# Patient Record
Sex: MALE | Race: WHITE | HISPANIC OR LATINO | Employment: STUDENT | URBAN - METROPOLITAN AREA
[De-identification: names, ages, dates, MRNs, and addresses within clinical notes are randomized per-mention and may not be internally consistent; named-entity substitution may affect disease eponyms.]

---
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
## 2021-03-26 ENCOUNTER — IMMUNIZATION (OUTPATIENT)
Dept: INTERNAL MEDICINE | Facility: CLINIC | Age: 21
End: 2021-03-26
Payer: COMMERCIAL

## 2021-03-26 DIAGNOSIS — Z23 NEED FOR VACCINATION: Primary | ICD-10-CM

## 2021-03-26 PROCEDURE — 91300 COVID-19, MRNA, LNP-S, PF, 30 MCG/0.3 ML DOSE VACCINE: CPT | Mod: PBBFAC | Performed by: FAMILY MEDICINE

## 2021-04-15 ENCOUNTER — OFFICE VISIT (OUTPATIENT)
Dept: INTERNAL MEDICINE | Facility: CLINIC | Age: 21
End: 2021-04-15
Payer: COMMERCIAL

## 2021-04-15 ENCOUNTER — LAB VISIT (OUTPATIENT)
Dept: LAB | Facility: HOSPITAL | Age: 21
End: 2021-04-15
Attending: FAMILY MEDICINE
Payer: COMMERCIAL

## 2021-04-15 VITALS
BODY MASS INDEX: 25.68 KG/M2 | SYSTOLIC BLOOD PRESSURE: 122 MMHG | TEMPERATURE: 100 F | DIASTOLIC BLOOD PRESSURE: 84 MMHG | OXYGEN SATURATION: 98 % | HEIGHT: 65 IN | WEIGHT: 154.13 LBS | HEART RATE: 100 BPM

## 2021-04-15 DIAGNOSIS — Z00.00 PREVENTATIVE HEALTH CARE: Primary | ICD-10-CM

## 2021-04-15 DIAGNOSIS — Z11.59 ENCOUNTER FOR HEPATITIS C SCREENING TEST FOR LOW RISK PATIENT: ICD-10-CM

## 2021-04-15 DIAGNOSIS — Z13.1 SCREENING FOR DIABETES MELLITUS: ICD-10-CM

## 2021-04-15 DIAGNOSIS — Z00.00 PREVENTATIVE HEALTH CARE: ICD-10-CM

## 2021-04-15 DIAGNOSIS — E55.9 VITAMIN D DEFICIENCY: ICD-10-CM

## 2021-04-15 DIAGNOSIS — Z13.220 SCREENING FOR LIPID DISORDERS: ICD-10-CM

## 2021-04-15 PROCEDURE — 3008F PR BODY MASS INDEX (BMI) DOCUMENTED: ICD-10-PCS | Mod: CPTII,S$GLB,, | Performed by: FAMILY MEDICINE

## 2021-04-15 PROCEDURE — 99395 PR PREVENTIVE VISIT,EST,18-39: ICD-10-PCS | Mod: S$GLB,,, | Performed by: FAMILY MEDICINE

## 2021-04-15 PROCEDURE — 1126F AMNT PAIN NOTED NONE PRSNT: CPT | Mod: S$GLB,,, | Performed by: FAMILY MEDICINE

## 2021-04-15 PROCEDURE — 99999 PR PBB SHADOW E&M-EST. PATIENT-LVL IV: ICD-10-PCS | Mod: PBBFAC,,, | Performed by: FAMILY MEDICINE

## 2021-04-15 PROCEDURE — 3008F BODY MASS INDEX DOCD: CPT | Mod: CPTII,S$GLB,, | Performed by: FAMILY MEDICINE

## 2021-04-15 PROCEDURE — 99999 PR PBB SHADOW E&M-EST. PATIENT-LVL IV: CPT | Mod: PBBFAC,,, | Performed by: FAMILY MEDICINE

## 2021-04-15 PROCEDURE — 86803 HEPATITIS C AB TEST: CPT | Performed by: FAMILY MEDICINE

## 2021-04-15 PROCEDURE — 82306 VITAMIN D 25 HYDROXY: CPT | Performed by: FAMILY MEDICINE

## 2021-04-15 PROCEDURE — 99395 PREV VISIT EST AGE 18-39: CPT | Mod: S$GLB,,, | Performed by: FAMILY MEDICINE

## 2021-04-15 PROCEDURE — 36415 COLL VENOUS BLD VENIPUNCTURE: CPT | Performed by: FAMILY MEDICINE

## 2021-04-15 PROCEDURE — 80061 LIPID PANEL: CPT | Performed by: FAMILY MEDICINE

## 2021-04-15 PROCEDURE — 1126F PR PAIN SEVERITY QUANTIFIED, NO PAIN PRESENT: ICD-10-PCS | Mod: S$GLB,,, | Performed by: FAMILY MEDICINE

## 2021-04-15 PROCEDURE — 82947 ASSAY GLUCOSE BLOOD QUANT: CPT | Performed by: FAMILY MEDICINE

## 2021-04-15 RX ORDER — VIT C/E/ZN/COPPR/LUTEIN/ZEAXAN 250MG-90MG
1000 CAPSULE ORAL DAILY
Qty: 90 CAPSULE | Refills: 4 | Status: SHIPPED | OUTPATIENT
Start: 2021-04-15 | End: 2021-12-27 | Stop reason: DRUGHIGH

## 2021-04-16 ENCOUNTER — IMMUNIZATION (OUTPATIENT)
Dept: INTERNAL MEDICINE | Facility: CLINIC | Age: 21
End: 2021-04-16
Payer: COMMERCIAL

## 2021-04-16 DIAGNOSIS — Z23 NEED FOR VACCINATION: Primary | ICD-10-CM

## 2021-04-16 LAB
25(OH)D3+25(OH)D2 SERPL-MCNC: 19 NG/ML (ref 30–96)
CHOLEST SERPL-MCNC: 193 MG/DL (ref 120–199)
CHOLEST/HDLC SERPL: 3.7 {RATIO} (ref 2–5)
GLUCOSE SERPL-MCNC: 78 MG/DL (ref 70–110)
HDLC SERPL-MCNC: 52 MG/DL (ref 40–75)
HDLC SERPL: 26.9 % (ref 20–50)
LDLC SERPL CALC-MCNC: 93.4 MG/DL (ref 63–159)
NONHDLC SERPL-MCNC: 141 MG/DL
TRIGL SERPL-MCNC: 238 MG/DL (ref 30–150)

## 2021-04-16 PROCEDURE — 0002A COVID-19, MRNA, LNP-S, PF, 30 MCG/0.3 ML DOSE VACCINE: CPT | Mod: PBBFAC | Performed by: FAMILY MEDICINE

## 2021-04-16 PROCEDURE — 91300 COVID-19, MRNA, LNP-S, PF, 30 MCG/0.3 ML DOSE VACCINE: CPT | Mod: PBBFAC | Performed by: FAMILY MEDICINE

## 2021-04-19 LAB — HCV AB SERPL QL IA: NEGATIVE

## 2021-12-22 ENCOUNTER — PATIENT OUTREACH (OUTPATIENT)
Dept: ADMINISTRATIVE | Facility: OTHER | Age: 21
End: 2021-12-22
Payer: COMMERCIAL

## 2021-12-27 ENCOUNTER — PATIENT MESSAGE (OUTPATIENT)
Dept: INTERNAL MEDICINE | Facility: CLINIC | Age: 21
End: 2021-12-27

## 2021-12-27 ENCOUNTER — OFFICE VISIT (OUTPATIENT)
Dept: INTERNAL MEDICINE | Facility: CLINIC | Age: 21
End: 2021-12-27
Payer: COMMERCIAL

## 2021-12-27 VITALS
OXYGEN SATURATION: 98 % | HEART RATE: 97 BPM | SYSTOLIC BLOOD PRESSURE: 112 MMHG | BODY MASS INDEX: 25.74 KG/M2 | TEMPERATURE: 99 F | WEIGHT: 152.31 LBS | DIASTOLIC BLOOD PRESSURE: 68 MMHG

## 2021-12-27 DIAGNOSIS — E55.9 VITAMIN D DEFICIENCY: ICD-10-CM

## 2021-12-27 DIAGNOSIS — F33.0 MAJOR DEPRESSIVE DISORDER, RECURRENT, MILD: ICD-10-CM

## 2021-12-27 DIAGNOSIS — F41.1 GENERALIZED ANXIETY DISORDER: Primary | ICD-10-CM

## 2021-12-27 PROCEDURE — 3008F PR BODY MASS INDEX (BMI) DOCUMENTED: ICD-10-PCS | Mod: CPTII,S$GLB,, | Performed by: FAMILY MEDICINE

## 2021-12-27 PROCEDURE — 99213 OFFICE O/P EST LOW 20 MIN: CPT | Mod: S$GLB,,, | Performed by: FAMILY MEDICINE

## 2021-12-27 PROCEDURE — 99999 PR PBB SHADOW E&M-EST. PATIENT-LVL IV: CPT | Mod: PBBFAC,,, | Performed by: FAMILY MEDICINE

## 2021-12-27 PROCEDURE — 3078F PR MOST RECENT DIASTOLIC BLOOD PRESSURE < 80 MM HG: ICD-10-PCS | Mod: CPTII,S$GLB,, | Performed by: FAMILY MEDICINE

## 2021-12-27 PROCEDURE — 99999 PR PBB SHADOW E&M-EST. PATIENT-LVL IV: ICD-10-PCS | Mod: PBBFAC,,, | Performed by: FAMILY MEDICINE

## 2021-12-27 PROCEDURE — 3078F DIAST BP <80 MM HG: CPT | Mod: CPTII,S$GLB,, | Performed by: FAMILY MEDICINE

## 2021-12-27 PROCEDURE — 3074F PR MOST RECENT SYSTOLIC BLOOD PRESSURE < 130 MM HG: ICD-10-PCS | Mod: CPTII,S$GLB,, | Performed by: FAMILY MEDICINE

## 2021-12-27 PROCEDURE — 99213 PR OFFICE/OUTPT VISIT, EST, LEVL III, 20-29 MIN: ICD-10-PCS | Mod: S$GLB,,, | Performed by: FAMILY MEDICINE

## 2021-12-27 PROCEDURE — 3074F SYST BP LT 130 MM HG: CPT | Mod: CPTII,S$GLB,, | Performed by: FAMILY MEDICINE

## 2021-12-27 PROCEDURE — 3008F BODY MASS INDEX DOCD: CPT | Mod: CPTII,S$GLB,, | Performed by: FAMILY MEDICINE

## 2021-12-27 RX ORDER — CHOLECALCIFEROL (VITAMIN D3) 50 MCG
1 TABLET ORAL DAILY
Qty: 90 TABLET | Refills: 4 | Status: SHIPPED | OUTPATIENT
Start: 2021-12-27 | End: 2024-09-21

## 2021-12-27 NOTE — ASSESSMENT & PLAN NOTE
Lab Results   Component Value Date    [id number] 19 (L) 04/15/2021    [id number] 18 (L) 06/08/2019   He hasn't been taking his vitamin D regularly. We discussed strategies to help him overcome any barriers to adherence to medication regimen.

## 2021-12-27 NOTE — PATIENT INSTRUCTIONS
I have referred you to see one of our excellent mental health specialists for counseling services.    You must act SOON and call [phone] to schedule your appointment. I encourage you to ask to be placed on the waiting list for canceled appointments because new appointments often open up a day or two in advance when someone else cancels their appointment.    IMPORTANT: If you feel that you are experiencing an emotional crisis, go to the nearest emergency room or call the crisis intervention center at [phone].       I'm giving you information about additional mental health counseling options available in the community if those are more convenient for you.         Puja Zhou, [facility]       [address], [address] [place] [address]       Phone: [phone]         Mehdi Altman, [facility]       [address] [place] [address]       Phone: [phone]         [facility]       [address] [place] [address]       Phone: [phone]       [email or web address]         [facility]       [address] [place] [address]       Phone: [phone]       [email or web address]

## 2021-12-27 NOTE — PROGRESS NOTES
CHIEF COMPLAINT: Anxiety    He reports chronic problems of depression and anxiety, previously treated by mental health counselor, but he lost that relationship at the start of the COVID-19 pandemic. He reports increased depression and anxiety symptoms over the last several months. He identifies a number of life circumstances as exacerbating factors. He reports no history of bipolar disorder or hypomania symptoms. He accepted referral for mental health counseling. He feels that the severity of symptoms do not warrant pharmacotherapy at this point. I instructed him to let me know if he changes his mind.      PHQ-15, a somatic symptom scale consisting of fifteen items, each of which is scored 0 to 2, providing a 0 to 30 severity score.  PHQ-15 Score = 1 (NORMAL = MILD somatic symptoms)    LAYNE-7, an anxiety screening scale consisting of seven items, each of which is scored 0 to 3, providing a 0 to 21 severity score.  LAYNE-7 score = 11 (MEDIUM anxiety)    Panic Disorder Screen: Negative    PHQ-9, a depression screening scale consisting of nine items, each of which is scored 0 to 3, providing a 0 to 27 severity score.  PHQ-9 SCORE = 9 (MILD depression)  PSYCHOTIC FEATURES (hallucinations, delusions, paranoia, disorganized speech, etc.) are ABSENT  SUICIDALITY ASSESSMENT: He has no suicidal thoughts or thoughts he would be better off dead.     No complaints or concerns reported except as noted herein.    DIAGNOSES SPECIFICALLY EVALUATED AND TREATED THIS ENCOUNTER  1. Generalized anxiety disorder  This is a chronic problem, unstable/uncontrolled or with exacerbation or progression.   - Ambulatory referral/consult to Psychology; Future    2. Major depressive disorder, recurrent, mild  This is a chronic problem, unstable/uncontrolled or with exacerbation or progression.  - Ambulatory referral/consult to Psychology; Future    3. Vitamin D deficiency  This is a chronic problem, stable.   - cholecalciferol, vitamin D3, (VITAMIN  D3) 50 mcg (2,000 unit) Tab; Take 1 tablet (2,000 Units total) by mouth once daily.  Dispense: 90 tablet; Refill: 4      Follow up in about 4 months (around 4/20/2022), or if symptoms worsen or fail to improve, for wellness and preventive services.    PRESCRIPTION DRUG MANAGEMENT  Outpatient Medications Prior to Visit   Medication Sig Dispense Refill    cholecalciferol, vitamin D3, (VITAMIN D3) 25 mcg (1,000 unit) capsule Take 1 capsule (1,000 Units total) by mouth once daily. 90 capsule 4     No facility-administered medications prior to visit.      Medications Discontinued During This Encounter   Medication Reason    cholecalciferol, vitamin D3, (VITAMIN D3) 25 mcg (1,000 unit) capsule Dose adjustment      Medications Ordered This Encounter   Medications    cholecalciferol, vitamin D3, (VITAMIN D3) 50 mcg (2,000 unit) Tab     Sig: Take 1 tablet (2,000 Units total) by mouth once daily.     Dispense:  90 tablet     Refill:  4     NOTE DOSE CHANGE. If insurance does not cover, please offer OTC equivalent. OK to vary dispense quantity to correspond with in-stock OTC unit quantities.       Problem List Items Addressed This Visit     Vitamin D deficiency    Current Assessment & Plan     Lab Results   Component Value Date    [id number] 19 (L) 04/15/2021    [id number] 18 (L) 06/08/2019   He hasn't been taking his vitamin D regularly. We discussed strategies to help him overcome any barriers to adherence to medication regimen.         Relevant Medications    cholecalciferol, vitamin D3, (VITAMIN D3) 50 mcg (2,000 unit) Tab      Other Visit Diagnoses     Generalized anxiety disorder    -  Primary    Relevant Orders    Ambulatory referral/consult to Psychology    Major depressive disorder, recurrent, mild        Relevant Orders    Ambulatory referral/consult to Psychology        DATA REVIEWED:     -lab results    Subjective   Review of Systems   Respiratory: Negative for chest tightness and shortness of breath.     Cardiovascular: Negative for chest pain.   Neurological: Negative for seizures and memory loss.   Psychiatric/Behavioral: Positive for dysphoric mood. Negative for agitation, confusion, hallucinations, self-injury and suicidal ideas. The patient is nervous/anxious.         PHYSICAL EXAMObjective   Vitals:    12/27/21 1324   BP: 112/68   BP Location: Left arm   Patient Position: Sitting   BP Method: Medium (Manual)   Pulse: 97   Temp: 99.2 °F (37.3 °C)   TempSrc: Tympanic   SpO2: 98%   Weight: 69.1 kg (152 lb 5.4 oz)     Physical Exam  Vitals reviewed.   Constitutional:       General: He is not in acute distress.     Appearance: Normal appearance. He is not ill-appearing, toxic-appearing or diaphoretic.   Cardiovascular:      Rate and Rhythm: Normal rate and regular rhythm.   Pulmonary:      Effort: Pulmonary effort is normal.   Neurological:      General: No focal deficit present.      Mental Status: He is alert and oriented to person, place, and time. Mental status is at baseline.   Psychiatric:         Attention and Perception: Attention and perception normal.         Mood and Affect: Affect normal. Mood is depressed (mild).         Speech: Speech normal.         Behavior: Behavior normal. Behavior is cooperative.         Thought Content: Thought content normal. Thought content is not paranoid. Thought content does not include homicidal or suicidal ideation.         Cognition and Memory: Cognition and memory normal.         Judgment: Judgment normal.        TOTAL TIME evaluating and managing this patient for this encounter was greater than or equal to 22 minutes. This time was spent personally by me on the following activities: review of nurse's notes, pre-charting, review of patient's past medical history, assessing age-appropriate health maintenance needs, review of any interval history, obtaining history from the patient, examination of the patient, ordering referral to subspecialty provider(s),  "patient-education regarding diagnosis, treatment plan, and goals of treatment, discussing planned follow-up and final documentation of the visit. This time was exclusive of any separately billable procedures for this patient and exclusive of time spent treating any other patients.    Documentation entered by me for this encounter may have been done in part using speech-recognition technology. Although I have made an effort to ensure accuracy, "sound like" errors may exist and should be interpreted in context. -RUDY Oquendo MD.    "

## 2021-12-28 ENCOUNTER — IMMUNIZATION (OUTPATIENT)
Dept: PRIMARY CARE CLINIC | Facility: CLINIC | Age: 21
End: 2021-12-28
Payer: COMMERCIAL

## 2021-12-28 DIAGNOSIS — Z23 NEED FOR VACCINATION: Primary | ICD-10-CM

## 2021-12-28 PROCEDURE — 0004A COVID-19, MRNA, LNP-S, PF, 30 MCG/0.3 ML DOSE VACCINE: CPT | Mod: CV19,PBBFAC | Performed by: FAMILY MEDICINE

## 2021-12-29 ENCOUNTER — OFFICE VISIT (OUTPATIENT)
Dept: OPHTHALMOLOGY | Facility: CLINIC | Age: 21
End: 2021-12-29
Payer: COMMERCIAL

## 2021-12-29 DIAGNOSIS — H53.8 BLURRED VISION, BILATERAL: Primary | ICD-10-CM

## 2021-12-29 DIAGNOSIS — H52.13 MYOPIA, BILATERAL: ICD-10-CM

## 2021-12-29 PROCEDURE — 92014 PR EYE EXAM, EST PATIENT,COMPREHESV: ICD-10-PCS | Mod: S$GLB,,, | Performed by: OPTOMETRIST

## 2021-12-29 PROCEDURE — 92015 PR REFRACTION: ICD-10-PCS | Mod: S$GLB,,, | Performed by: OPTOMETRIST

## 2021-12-29 PROCEDURE — 92014 COMPRE OPH EXAM EST PT 1/>: CPT | Mod: S$GLB,,, | Performed by: OPTOMETRIST

## 2021-12-29 PROCEDURE — 92015 DETERMINE REFRACTIVE STATE: CPT | Mod: S$GLB,,, | Performed by: OPTOMETRIST

## 2021-12-29 PROCEDURE — 99999 PR PBB SHADOW E&M-EST. PATIENT-LVL II: ICD-10-PCS | Mod: PBBFAC,,, | Performed by: OPTOMETRIST

## 2021-12-29 PROCEDURE — 99999 PR PBB SHADOW E&M-EST. PATIENT-LVL II: CPT | Mod: PBBFAC,,, | Performed by: OPTOMETRIST

## 2022-01-03 ENCOUNTER — IMMUNIZATION (OUTPATIENT)
Dept: PHARMACY | Facility: CLINIC | Age: 22
End: 2022-01-03
Payer: COMMERCIAL
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
## 2022-02-01 ENCOUNTER — TELEPHONE (OUTPATIENT)
Dept: PSYCHIATRY | Facility: CLINIC | Age: 22
End: 2022-02-01
Payer: COMMERCIAL

## 2022-02-01 ENCOUNTER — OFFICE VISIT (OUTPATIENT)
Dept: PSYCHIATRY | Facility: CLINIC | Age: 22
End: 2022-02-01
Payer: COMMERCIAL

## 2022-02-01 ENCOUNTER — LAB VISIT (OUTPATIENT)
Dept: LAB | Facility: HOSPITAL | Age: 22
End: 2022-02-01
Payer: COMMERCIAL

## 2022-02-01 VITALS
DIASTOLIC BLOOD PRESSURE: 78 MMHG | SYSTOLIC BLOOD PRESSURE: 123 MMHG | WEIGHT: 149.06 LBS | HEART RATE: 81 BPM | BODY MASS INDEX: 25.19 KG/M2

## 2022-02-01 DIAGNOSIS — F32.A DEPRESSION, UNSPECIFIED DEPRESSION TYPE: ICD-10-CM

## 2022-02-01 DIAGNOSIS — F41.1 GENERALIZED ANXIETY DISORDER: ICD-10-CM

## 2022-02-01 DIAGNOSIS — G47.00 INSOMNIA, UNSPECIFIED TYPE: ICD-10-CM

## 2022-02-01 DIAGNOSIS — F41.1 GENERALIZED ANXIETY DISORDER: Primary | ICD-10-CM

## 2022-02-01 LAB
ALBUMIN SERPL BCP-MCNC: 4.2 G/DL (ref 3.5–5.2)
ALP SERPL-CCNC: 87 U/L (ref 55–135)
ALT SERPL W/O P-5'-P-CCNC: 15 U/L (ref 10–44)
ANION GAP SERPL CALC-SCNC: 9 MMOL/L (ref 8–16)
AST SERPL-CCNC: 17 U/L (ref 10–40)
BASOPHILS # BLD AUTO: 0.06 K/UL (ref 0–0.2)
BASOPHILS NFR BLD: 0.8 % (ref 0–1.9)
BILIRUB SERPL-MCNC: 0.5 MG/DL (ref 0.1–1)
BUN SERPL-MCNC: 11 MG/DL (ref 6–20)
CALCIUM SERPL-MCNC: 9.7 MG/DL (ref 8.7–10.5)
CHLORIDE SERPL-SCNC: 103 MMOL/L (ref 95–110)
CO2 SERPL-SCNC: 28 MMOL/L (ref 23–29)
CREAT SERPL-MCNC: 0.9 MG/DL (ref 0.5–1.4)
DIFFERENTIAL METHOD: NORMAL
EOSINOPHIL # BLD AUTO: 0.1 K/UL (ref 0–0.5)
EOSINOPHIL NFR BLD: 1.1 % (ref 0–8)
ERYTHROCYTE [DISTWIDTH] IN BLOOD BY AUTOMATED COUNT: 12.3 % (ref 11.5–14.5)
EST. GFR  (AFRICAN AMERICAN): >60 ML/MIN/1.73 M^2
EST. GFR  (NON AFRICAN AMERICAN): >60 ML/MIN/1.73 M^2
GLUCOSE SERPL-MCNC: 89 MG/DL (ref 70–110)
HCT VFR BLD AUTO: 47.3 % (ref 40–54)
HGB BLD-MCNC: 15.5 G/DL (ref 14–18)
IMM GRANULOCYTES # BLD AUTO: 0.02 K/UL (ref 0–0.04)
IMM GRANULOCYTES NFR BLD AUTO: 0.3 % (ref 0–0.5)
LYMPHOCYTES # BLD AUTO: 2 K/UL (ref 1–4.8)
LYMPHOCYTES NFR BLD: 28 % (ref 18–48)
MCH RBC QN AUTO: 30.5 PG (ref 27–31)
MCHC RBC AUTO-ENTMCNC: 32.8 G/DL (ref 32–36)
MCV RBC AUTO: 93 FL (ref 82–98)
MONOCYTES # BLD AUTO: 0.7 K/UL (ref 0.3–1)
MONOCYTES NFR BLD: 9.9 % (ref 4–15)
NEUTROPHILS # BLD AUTO: 4.3 K/UL (ref 1.8–7.7)
NEUTROPHILS NFR BLD: 59.9 % (ref 38–73)
NRBC BLD-RTO: 0 /100 WBC
PLATELET # BLD AUTO: 259 K/UL (ref 150–450)
PMV BLD AUTO: 10.3 FL (ref 9.2–12.9)
POTASSIUM SERPL-SCNC: 4 MMOL/L (ref 3.5–5.1)
PROT SERPL-MCNC: 7.9 G/DL (ref 6–8.4)
RBC # BLD AUTO: 5.09 M/UL (ref 4.6–6.2)
SODIUM SERPL-SCNC: 140 MMOL/L (ref 136–145)
T4 FREE SERPL-MCNC: 1.01 NG/DL (ref 0.71–1.51)
TSH SERPL DL<=0.005 MIU/L-ACNC: 1.73 UIU/ML (ref 0.4–4)
WBC # BLD AUTO: 7.25 K/UL (ref 3.9–12.7)

## 2022-02-01 PROCEDURE — 3078F PR MOST RECENT DIASTOLIC BLOOD PRESSURE < 80 MM HG: ICD-10-PCS | Mod: CPTII,S$GLB,, | Performed by: PSYCHIATRY & NEUROLOGY

## 2022-02-01 PROCEDURE — 3008F BODY MASS INDEX DOCD: CPT | Mod: CPTII,S$GLB,, | Performed by: PSYCHIATRY & NEUROLOGY

## 2022-02-01 PROCEDURE — 3008F PR BODY MASS INDEX (BMI) DOCUMENTED: ICD-10-PCS | Mod: CPTII,S$GLB,, | Performed by: PSYCHIATRY & NEUROLOGY

## 2022-02-01 PROCEDURE — 84439 ASSAY OF FREE THYROXINE: CPT | Performed by: PSYCHIATRY & NEUROLOGY

## 2022-02-01 PROCEDURE — 90792 PSYCH DIAG EVAL W/MED SRVCS: CPT | Mod: S$GLB,,, | Performed by: PSYCHIATRY & NEUROLOGY

## 2022-02-01 PROCEDURE — 80053 COMPREHEN METABOLIC PANEL: CPT | Performed by: PSYCHIATRY & NEUROLOGY

## 2022-02-01 PROCEDURE — 3074F PR MOST RECENT SYSTOLIC BLOOD PRESSURE < 130 MM HG: ICD-10-PCS | Mod: CPTII,S$GLB,, | Performed by: PSYCHIATRY & NEUROLOGY

## 2022-02-01 PROCEDURE — 36415 COLL VENOUS BLD VENIPUNCTURE: CPT | Performed by: PSYCHIATRY & NEUROLOGY

## 2022-02-01 PROCEDURE — 85025 COMPLETE CBC W/AUTO DIFF WBC: CPT | Performed by: PSYCHIATRY & NEUROLOGY

## 2022-02-01 PROCEDURE — 3074F SYST BP LT 130 MM HG: CPT | Mod: CPTII,S$GLB,, | Performed by: PSYCHIATRY & NEUROLOGY

## 2022-02-01 PROCEDURE — 99999 PR PBB SHADOW E&M-EST. PATIENT-LVL III: CPT | Mod: PBBFAC,,, | Performed by: PSYCHIATRY & NEUROLOGY

## 2022-02-01 PROCEDURE — 90792 PR PSYCHIATRIC DIAGNOSTIC EVALUATION W/MEDICAL SERVICES: ICD-10-PCS | Mod: S$GLB,,, | Performed by: PSYCHIATRY & NEUROLOGY

## 2022-02-01 PROCEDURE — 3078F DIAST BP <80 MM HG: CPT | Mod: CPTII,S$GLB,, | Performed by: PSYCHIATRY & NEUROLOGY

## 2022-02-01 PROCEDURE — 84443 ASSAY THYROID STIM HORMONE: CPT | Performed by: PSYCHIATRY & NEUROLOGY

## 2022-02-01 PROCEDURE — 99999 PR PBB SHADOW E&M-EST. PATIENT-LVL III: ICD-10-PCS | Mod: PBBFAC,,, | Performed by: PSYCHIATRY & NEUROLOGY

## 2022-02-22 ENCOUNTER — OFFICE VISIT (OUTPATIENT)
Dept: PSYCHIATRY | Facility: CLINIC | Age: 22
End: 2022-02-22
Payer: COMMERCIAL

## 2022-02-22 DIAGNOSIS — F41.1 GENERALIZED ANXIETY DISORDER: Primary | ICD-10-CM

## 2022-02-22 DIAGNOSIS — F32.A DEPRESSION, UNSPECIFIED DEPRESSION TYPE: ICD-10-CM

## 2022-02-22 PROCEDURE — 90791 PSYCH DIAGNOSTIC EVALUATION: CPT | Mod: 95,,, | Performed by: SOCIAL WORKER

## 2022-02-22 PROCEDURE — 90791 PR PSYCHIATRIC DIAGNOSTIC EVALUATION: ICD-10-PCS | Mod: 95,,, | Performed by: SOCIAL WORKER
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
## 2022-03-01 NOTE — PROGRESS NOTES
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      Site:  [facility]    Salomón Olson is a 21 y.o.  single male with a past psychiatric history of LAYNE and unspecified Depression Who presents for follow up.   Last seen on 2/01/2022  At that visit, these are the recommendations:  No medications started since patient wants to try therapy.   The patient is seeing Ms. Sahu for therapy.       History of Present Illness:     Feels a bit better because some of the issue he avoided.  Anxiety is better.   Anxiety is not bad.  Because he is avoiding to school.  Only medication he tried is melatonin.     Grades are not good.   Was going to tell mom that he wants to resign.   Has not been attending class.  Just staying home and in bed.  Has not gone to any classes.  No si/hi. Denies a/v hallucinations.   Working about 13 hours a week and running errands.   Appetite is the same.   Hit or miss.   No Panic attacks.     PHQ-9 Depression Patient Health Questionnaire 3/15/2022   Over the last two weeks how often have you been bothered by little interest or pleasure in doing things 1   Over the last two weeks how often have you been bothered by feeling down, depressed or hopeless 1   Over the last two weeks how often have you been bothered by trouble falling or staying asleep, or sleeping too much 2   Over the last two weeks how often have you been bothered by feeling tired or having little energy 2   Over the last two weeks how often have you been bothered by a poor appetite or overeating 1   Over the last two weeks how often have you been bothered by feeling bad about yourself - or that you are a failure or have let yourself or your family down 1   Over the last two weeks how often have you been bothered by trouble concentrating on things, such as  reading the newspaper or watching television 1   Over the last two weeks how often have you been bothered by moving or speaking so slowly that other people could have noticed. 0   Over the last two weeks how often have you been bothered by thoughts that you would be better off dead, or of hurting yourself 0   If you checked off any problems, how difficult have these problems made it for you to do your work, take care of things at home or get along with other people? Somewhat difficult   Total Score 9     LAYNE-7 3/15/2022   Was test performed? Yes   1. Feeling nervous, anxious, or on edge? Several days   2. Not being able to stop or control worrying? Several days   3. Worrying too much about different things? Several days   4. Trouble relaxing? Several days   5. Being so restless that it is hard to sit still? Several days   6. Becoming easily annoyed or irritable? Several days   7. Feeling afraid as if something awful might happen? Several days   8. If you checked off any problems, how difficult have these problems made it for you to do your work, take care of things at home, or get along with other people? Somewhat difficult   LAYNE-7 Score 7       Substance Use:   denies any eating disorders.  denies alcohol use.  denies marijuana use   denies illicit drugs.  denies tobacco use.    Past Psychiatric History:   Previous Psychiatric Hospitalizations: No  Previous SI/HI: No  Previous Suicide Attempts: No  Psychiatric Care (current & past): unsure  History of Psychotherapy: Yes - saw 2 in the past.   Before covid.   He was seeing the therapist 1 or twice.         Past Psychiatric Medication Trials: denies       Social History:     Raised by his parents.   Mediocre childhood due to thinking of good and bad things.  no siblings    Marital Status: single, not in a relationship  Children: no  Education: avni at [employer]  Support Person: mom     Current Living Circumstances:  Lives with his parents.      Family Psychiatric  History: think uncle has schizophrenia.  Mom and GM wanted to see a therapist.    Trauma History: does not want to talk about it at this time.     Legal History: denies      Current Medications:   Current Outpatient Medications   Medication Instructions    cholecalciferol (vitamin D3) (VITAMIN D3) 2,000 Units, Oral, Daily       Allergies:   Review of patient's allergies indicates:  No Known Allergies    Review Of Systems:      General : NO chills or fever   Eyes: NO  visual changes   ENT: NO hearing change, nasal discharge or sore throat   Endocrine: NO weight changes or polydipsia/polyuria   Dermatological: NO rashes   Respiratory: NO cough, shortness of breath   Cardiovascular: NO chest pain, palpitations or racing heart   Gastrointestinal: NO nausea, vomiting, constipation or diarrhea   Musculoskeletal: NO muscle pain or stiffness   Neurological: NO confusion, dizziness, headaches or tremors   Psychiatric: please see HPI    Past Medical History:     Past Medical History:   Diagnosis Date    Acne vulgaris 2/25/2020    Allergy     Asthma     Generalized anxiety disorder 2/1/2022         Past Surgical History:      has a past surgical history that includes NONE; Tympanostomy tube placement; and Circumcision.    Birth and Developmental History:     Evaluated and found noncontributory.    Current Evaluation:       Constitutional  Vitals:  Vitals:    03/15/22 1452   BP: 112/77   Pulse: 86   Weight: 68.2 kg (150 lb 3.9 oz)      General:  unremarkable, age appropriate     Musculoskeletal  Muscle Strength/Tone:  no tremor, no tic   Gait & Station:  non-ataxic     Mental Status Exam:  Comment:glasses.  Poor eye contact.     Appearance: of stated age Casually dressed  Behavior:  cooperative   Psychomotor: Within Normal Limits   Speech:  normal rate, rhythm and volume  Mood:  Neutural, somtime happy and sometimes sad.   Affect:  dysphoric  Thought Process:  within normal limits  Associations: intact  Thought  Content:  No si NO thoughts to hurt self. NO homicidal ideations.   Thought Perceptions: no a/v hallucinations.  NO delusions.   Memory:  Intact  Attention Span and Concentration:  Normal  Fund of Knowledge:  Intact  Estimate of Intelligence:  Average   Language: no abnormalities  Insight/Judgement:  Fair  Cognition:  grossly intact  Orientation:  person, place, time and situation    LABORATORY DATA  No visits with results within 1 Month(s) from this visit.   Latest known visit with results is:   Lab Visit on 02/01/2022   Component Date Value Ref Range Status    WBC 02/01/2022 7.25  3.90 - 12.70 K/uL Final    RBC 02/01/2022 5.09  4.60 - 6.20 M/uL Final    Hemoglobin 02/01/2022 15.5  14.0 - 18.0 g/dL Final    Hematocrit 02/01/2022 47.3  40.0 - 54.0 % Final    MCV 02/01/2022 93  82 - 98 fL Final    MCH 02/01/2022 30.5  27.0 - 31.0 pg Final    MCHC 02/01/2022 32.8  32.0 - 36.0 g/dL Final    RDW 02/01/2022 12.3  11.5 - 14.5 % Final    Platelets 02/01/2022 259  150 - 450 K/uL Final    MPV 02/01/2022 10.3  9.2 - 12.9 fL Final    Immature Granulocytes 02/01/2022 0.3  0.0 - 0.5 % Final    Gran # (ANC) 02/01/2022 4.3  1.8 - 7.7 K/uL Final    Immature Grans (Abs) 02/01/2022 0.02  0.00 - 0.04 K/uL Final    Lymph # 02/01/2022 2.0  1.0 - 4.8 K/uL Final    Mono # 02/01/2022 0.7  0.3 - 1.0 K/uL Final    Eos # 02/01/2022 0.1  0.0 - 0.5 K/uL Final    Baso # 02/01/2022 0.06  0.00 - 0.20 K/uL Final    nRBC 02/01/2022 0  0 /100 WBC Final    Gran % 02/01/2022 59.9  38.0 - 73.0 % Final    Lymph % 02/01/2022 28.0  18.0 - 48.0 % Final    Mono % 02/01/2022 9.9  4.0 - 15.0 % Final    Eosinophil % 02/01/2022 1.1  0.0 - 8.0 % Final    Basophil % 02/01/2022 0.8  0.0 - 1.9 % Final    Differential Method 02/01/2022 Automated   Final    Sodium 02/01/2022 140  136 - 145 mmol/L Final    Potassium 02/01/2022 4.0  3.5 - 5.1 mmol/L Final    Chloride 02/01/2022 103  95 - 110 mmol/L Final    CO2 02/01/2022 28  23 - 29 mmol/L  Final    Glucose 02/01/2022 89  70 - 110 mg/dL Final    BUN 02/01/2022 11  6 - 20 mg/dL Final    Creatinine 02/01/2022 0.9  0.5 - 1.4 mg/dL Final    Calcium 02/01/2022 9.7  8.7 - 10.5 mg/dL Final    Total Protein 02/01/2022 7.9  6.0 - 8.4 g/dL Final    Albumin 02/01/2022 4.2  3.5 - 5.2 g/dL Final    Total Bilirubin 02/01/2022 0.5  0.1 - 1.0 mg/dL Final    Alkaline Phosphatase 02/01/2022 87  55 - 135 U/L Final    AST 02/01/2022 17  10 - 40 U/L Final    ALT 02/01/2022 15  10 - 44 U/L Final    Anion Gap 02/01/2022 9  8 - 16 mmol/L Final    eGFR if African American 02/01/2022 >60.0  >60 mL/min/1.73 m^2 Final    eGFR if non African American 02/01/2022 >60.0  >60 mL/min/1.73 m^2 Final    TSH 02/01/2022 1.734  0.400 - 4.000 uIU/mL Final    Free T4 02/01/2022 1.01  0.71 - 1.51 ng/dL Final                 Assessment - Diagnosis - Goals:     Assessment and Diagnosis     Status/Progress: Based on the examination today, the patient's problem(s) is/are not improving. New problems have not presented today. Co-morbidities are not complicating management of the primary condition. There are active rule-out diagnoses for this patient at this time.      1. Generalized anxiety disorder     2. Depression, unspecified depression type          Interventions/Recommendations/Plan:    PROBLEM:anxiety  COMMENT:baseline  PLAN: will start Lexapro 10mg qhs to target anxiety/depressoin  will refer to first available therapist. will continue to monitor.   Will recommend labs to rule out GMC.      PROBLEM:depression  COMMENT:baseline  PLAN:see above plan.     PROBLEM:sleep  COMMENT:baseline  PLAN: will recommend good sleep hygiene.   No more than 45 minutes nap.   Recommend otc melatonin.  If that does not work, contact this provider        Return to Clinic: 4 weeks         SAFETY: plan discussed with patient. Abstain from drug/etoh/tobacco use. Patient to have no access to guns/ weapons. If any suicidal or homicidal ideation or  plan, or new or worsening symptoms, call 911/go to ED. Risks, benefits , and alternatives to treatment discussed with patient and guardian who demonstrated understanding and agreement and chose to treat. Patient will call if any questions or concerns. Continue regular follow up with PCP for all non-psychiatric medical conditions.        Lanhuong Nguyen DO [facility]

## 2022-03-10 ENCOUNTER — OFFICE VISIT (OUTPATIENT)
Dept: PSYCHIATRY | Facility: CLINIC | Age: 22
End: 2022-03-10
Payer: COMMERCIAL

## 2022-03-10 DIAGNOSIS — F41.1 GENERALIZED ANXIETY DISORDER: Primary | ICD-10-CM

## 2022-03-10 DIAGNOSIS — F32.A DEPRESSION, UNSPECIFIED DEPRESSION TYPE: ICD-10-CM

## 2022-03-10 PROCEDURE — 90837 PSYTX W PT 60 MINUTES: CPT | Mod: 95,,, | Performed by: SOCIAL WORKER

## 2022-03-10 PROCEDURE — 90837 PR PSYCHOTHERAPY W/PATIENT, 60 MIN: ICD-10-PCS | Mod: 95,,, | Performed by: SOCIAL WORKER

## 2022-03-10 NOTE — PROGRESS NOTES
Individual Psychotherapy Follow-up Visit Progress Note (PhD/LCSW)     Outpatient Psychotherapy - 60 minutes with patient (53 minutes or more) - [id number]    Date: 03/10/2022    Visit Type: Telehealth    Due to the nature of this visit type, a virtual visit with synchronous audio and video, each patient to whom this provider administers behavioral health services by telemedicine is: (1) informed of the relationship between the provider and patient and the respective role of any other health care provider with respect to management of the patient; and (2) notified that he or she may decline to receive services by telemedicine and may withdraw from such care at any time.    The patient was informed of the following:     Provider's contact info:  [facility]  [id number] [place] [address], [address] Floor, [address]  [place] [address]  (Phone) [phone]    If technology issues occur, call office phone: Ph: [phone]  If crisis: Dial 911 or go to nearest Emergency Room (ER)  If questions related to privacy practices: contact [facility] Information Department: [phone]    For security purposes, the pt identified that they were at [address] [place] [address] during today's session and contact number is [phone] (home) .    The pt's emergency contact(s) is Extended Emergency Contact Information  Primary Emergency Contact: Samuel Olson  Address: [address] [place] [id number] [facility]  Home Phone: [phone]  Mobile Phone: [phone]  Relation: Father.    Crisis Disclaimer: Patient was informed that due to the virtual nature of the visit, that if a crisis develops, protocols will be implemented to ensure patient safety, including but not limited to: 1) Initiating a welfare check with local law enforcement and/or 2) Calling 911    3/10/2022  MRN: [id number]  Primary Care Provider: MD Salomón Reed is a  "21 y.o. male who presents today for follow-up of depression and anxiety. Met with patient.      Preferred Name: Salomón     Subjective:       Content of Current Session: Pt entered this visit without camera on and was apprehensive to turn on the camera. LCSW encouraged pt to turn on camera and educated the pt on the requirement. Pt obliged and turned camera on. Pt was very reserved and engaged with this provider minimally throughout session. Pt reported, "I'm ok I guess" upon entry to this session. LCSW utilized active listening/reflection to engage with pt and review experiences since their last session with this provider. Pt requested to forego completion of the previsit PHQ/LAYNE questionnaire. LCSW educated the pt on the importance of utilization of PHQ/LAYNE in monitoring progress and reviewed scores with pt. Pt reported he had not noticed any change in his moods. Pt reported "the stressors are still there." Pt reported his stressors are "pressure from my parents and school." LCSW utilized cognitive therapy and supportive therapy. LCSW attempted to engage with pt in regards to his noted stressors but was frequently met with philip answers. Pt reported feeling his father talks "at" him rather than "to" him and feels he frequently lectures him. Pt attributed his father's communication style on his father's personality and his personal minimal verbalizations. Pt reported his communication with his mother is also minimal due to "sometimes she talks about school and I just don't want to talk." Pt reported he was only comfortable having "small talk" with his parents. LCSW discussed utilization of communication skills to reduce his anxiety response. LCSW discussed various forms of communication and utilization of each modality to best convey messages. LCSW discussed journalling as a method of reducing his racing thoughts and processing his thoughts. LCSW discussed letter writing to his parents to initiate communication. LCSW " "attempted to build rapport with pt; however, pt presented as apprehensive as evidenced by his minimal feedback. LCSW encouraged pt to utilize journaling and written communication to reduce his anxiety response and improve his relationship with his parents. Pt reported at the commencement of the session he had tried OTC Melantonin and noted "it makes me drowsy but I don't go to sleep but I've only taken 2.5mg once and 5mg." LCSW advised pt to discuss his medication concerns in his upcoming appointment with Dr. Ferraro. Pt denied SI/HI/AVH.     Therapeutic Interventions Utilized During Current Session: Cognitive Processing Therapy, Supportive Therapy    GAD7 3/10/2022 2/22/2022 2/1/2022   1. Feeling nervous, anxious, or on edge? 1 1 1   2. Not being able to stop or control worrying? 1 1 2   3. Worrying too much about different things? 1 1 2   4. Trouble relaxing? 1 1 1   5. Being so restless that it is hard to sit still? 1 0 0   6. Becoming easily annoyed or irritable? 1 1 1   7. Feeling afraid as if something awful might happen? 1 1 0   8. If you checked off any problems, how difficult have these problems made it for you to do your work, take care of things at home, or get along with other people? - - 1   LAYNE-7 Score 7 6 7      0-4 = Minimal anxiety  5-9 = Mild anxiety  10-14 = Moderate anxiety  15-21 = Severe anxiety     PHQ-9 Depression Patient Health Questionnaire 3/10/2022 2/22/2022   Patient agreed to terms: Yes Yes   Little interest or pleasure in doing things 1 1   Feeling down, depressed, or hopeless 1 1   Trouble falling or staying asleep, or sleeping too much 2 2   Feeling tired or having little energy 2 2   Poor appetite or overeating 2 1   Feeling bad about yourself - or that you are a failure or have let yourself or your family down 2 2   Trouble concentrating on things, such as reading the newspaper or watching television 1 1   Moving or speaking so slowly that other people could have noticed. Or the " opposite - being so fidgety or restless that you have been moving around a lot more than usual 0 0   Thoughts that you would be better off dead, or of hurting yourself in some way 0 1   PHQ-9 Total Score 11 11   If you checked off any problems, how difficult have these problems made it for you to do your work, take care of things at home, or get along with other people? Somewhat difficult Very difficult   Interpretation Moderate Moderate     0-4 = No intervention  5 to 9 = Mild  10 to 14 = Moderate  15 to 19 = Moderately severe  ?20 = Severe      Objective:       Mental Status Evaluation  Appearance: unremarkable, age appropriate  Behavior: reluctant to participate, eye contact minimal, decreased verbal interaction  Speech: normal tone, normal rate, normal pitch, normal volume  Mood: neutral  Affect: flat  Thought Process: normal and logical  Thought Content: normal, no suicidality, no homicidality, delusions, or paranoia  Sensorium: grossly intact  Cognition: grossly intact  Insight: intact  Judgment: adequate to circumstances    Risk parameters:  Patient reports no suicidal ideation  Patient reports no homicidal ideation  Patient reports no self-injurious behavior  Patient reports no violent behavior      Assessment & Plan:     The patient's response to the interventions is accepting    The patient's progress toward treatment goals is not progressing due to this is pt's second visit with this provider    Homework assigned: none     Treatment plan:   A. Target symptoms: Depression and Anxiety   B. Therapeutic modalities: insight oriented psychotherapy, behavior modifying psychotherapy, supportive psychotherapy  C. Why chosen therapy is appropriate versus another modality: relevant to diagnosis, patient responds to this modality, evidence based practice   D. Outcome monitoring methods: self report, observation, rating scales, feedback from clinical staff      Visit Diagnosis:   1. Generalized anxiety disorder    2.  Depression, unspecified depression type        Follow-up: individual psychotherapy    Return to Clinic: 2 weeks, 3 weeks  Pt Reported to Schedule via Epic EMR MyChart Application and/or Department Phoneline          Adelaide Sahu LCSW  03/10/2022   5:05 PM

## 2022-03-15 ENCOUNTER — OFFICE VISIT (OUTPATIENT)
Dept: PSYCHIATRY | Facility: CLINIC | Age: 22
End: 2022-03-15
Payer: COMMERCIAL

## 2022-03-15 VITALS
WEIGHT: 150.25 LBS | SYSTOLIC BLOOD PRESSURE: 112 MMHG | BODY MASS INDEX: 25.39 KG/M2 | DIASTOLIC BLOOD PRESSURE: 77 MMHG | HEART RATE: 86 BPM

## 2022-03-15 DIAGNOSIS — F32.A DEPRESSION, UNSPECIFIED DEPRESSION TYPE: ICD-10-CM

## 2022-03-15 DIAGNOSIS — F41.1 GENERALIZED ANXIETY DISORDER: Primary | ICD-10-CM

## 2022-03-15 PROCEDURE — 3074F SYST BP LT 130 MM HG: CPT | Mod: CPTII,S$GLB,, | Performed by: PSYCHIATRY & NEUROLOGY

## 2022-03-15 PROCEDURE — 3008F PR BODY MASS INDEX (BMI) DOCUMENTED: ICD-10-PCS | Mod: CPTII,S$GLB,, | Performed by: PSYCHIATRY & NEUROLOGY

## 2022-03-15 PROCEDURE — 99214 OFFICE O/P EST MOD 30 MIN: CPT | Mod: S$GLB,,, | Performed by: PSYCHIATRY & NEUROLOGY

## 2022-03-15 PROCEDURE — 99999 PR PBB SHADOW E&M-EST. PATIENT-LVL II: CPT | Mod: PBBFAC,,, | Performed by: PSYCHIATRY & NEUROLOGY

## 2022-03-15 PROCEDURE — 3074F PR MOST RECENT SYSTOLIC BLOOD PRESSURE < 130 MM HG: ICD-10-PCS | Mod: CPTII,S$GLB,, | Performed by: PSYCHIATRY & NEUROLOGY

## 2022-03-15 PROCEDURE — 99999 PR PBB SHADOW E&M-EST. PATIENT-LVL II: ICD-10-PCS | Mod: PBBFAC,,, | Performed by: PSYCHIATRY & NEUROLOGY

## 2022-03-15 PROCEDURE — 99214 PR OFFICE/OUTPT VISIT, EST, LEVL IV, 30-39 MIN: ICD-10-PCS | Mod: S$GLB,,, | Performed by: PSYCHIATRY & NEUROLOGY

## 2022-03-15 PROCEDURE — 3078F PR MOST RECENT DIASTOLIC BLOOD PRESSURE < 80 MM HG: ICD-10-PCS | Mod: CPTII,S$GLB,, | Performed by: PSYCHIATRY & NEUROLOGY

## 2022-03-15 PROCEDURE — 3078F DIAST BP <80 MM HG: CPT | Mod: CPTII,S$GLB,, | Performed by: PSYCHIATRY & NEUROLOGY

## 2022-03-15 PROCEDURE — 3008F BODY MASS INDEX DOCD: CPT | Mod: CPTII,S$GLB,, | Performed by: PSYCHIATRY & NEUROLOGY

## 2022-03-15 RX ORDER — ESCITALOPRAM OXALATE 10 MG/1
TABLET ORAL
Qty: 30 TABLET | Refills: 2 | Status: SHIPPED | OUTPATIENT
Start: 2022-03-15 | End: 2022-04-20 | Stop reason: DRUGHIGH

## 2022-04-06 NOTE — PROGRESS NOTES
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      Site:  [facility]    Salomón Olson is a 22 y.o.  single male with a past psychiatric history of LAYNE and unspecified Depression Who presents for follow up.   Last seen on 3/15/2022  At that visit, these are the recommendations:  will start Lexapro 10mg qhs to target anxiety/depressoin  The patient is seeing Ms. Sahu for therapy.       History of Present Illness:     He started on the lexapro 1 day later after it was prescribed.  No side effects from the medications.   Hard to tell if the medication is helping.    Dad picks up the medication.  Rates depression as mild.  Denies si/hi.   Denies a/v hallucination.  Sometimes not sleeping  Stopped going to school. Officially April 11.   Feels the anxiety is more than the depression.  Little day to day thing worries him - he is worried about the future.  No longer working at [employer].    What he has been doing instead is going out with families.     No Panic attacks.    No new allergies. No new medical conditions. No new surgeries.  Since the last visit.    Mom came here to talk about the paperwork.       Patient feels that he has slight depression and anxiety  Notes being burn out and having lack of motivations.  Before 1 year ago.  Was not depressed. Parents would see his actions and think he was depressed.  Feels he need to talk to the .      Dad is pressuring the patient.  The patient experience anxiety when he feels pressured.  Currently not as bad anxiety due to not being in school.   There are no stressors due to not being at work or school.     Not sure what he will do in the fall for school.     Family dynamics that is making him feel pressured.         Per mother:  He withdraw this semester. He has TOPS.  In the fall 2.9,  he was in business administration.  Feels that the patient has depression. Worried that he might have schizophenia since it runs in the family.    Mom feels that the patient has depression.   He is laying in bed all the time. Ongoing for 6 years.    He has to be in class at 9am. He is missing the classes because he is not able to wake up.  More interested later when he was working.     PHQ-9 Depression Patient Health Questionnaire 4/20/2022   Over the last two weeks how often have you been bothered by little interest or pleasure in doing things 1   Over the last two weeks how often have you been bothered by feeling down, depressed or hopeless 1   Over the last two weeks how often have you been bothered by trouble falling or staying asleep, or sleeping too much 2   Over the last two weeks how often have you been bothered by feeling tired or having little energy 1   Over the last two weeks how often have you been bothered by a poor appetite or overeating 1   Over the last two weeks how often have you been bothered by feeling bad about yourself - or that you are a failure or have let yourself or your family down 1   Over the last two weeks how often have you been bothered by trouble concentrating on things, such as reading the newspaper or watching television 1   Over the last two weeks how often have you been bothered by moving or speaking so slowly that other people could have noticed. 0   Over the last two weeks how often have you been bothered by thoughts that you would be better off dead, or of hurting yourself 0   If you checked off any problems, how difficult have these problems made it for you to do your work, take care of things at home or get along with other people? Somewhat difficult   Total Score 8     LAYNE-7 4/20/2022   Was test performed? Yes   1. Feeling nervous, anxious, or on edge? Several days   2. Not being able to stop or control worrying? Several days   3. Worrying too much about different things?  Several days   4. Trouble relaxing? Not at all   5. Being so restless that it is hard to sit still? Not at all   6. Becoming easily annoyed or irritable? Several days   7. Feeling afraid as if something awful might happen? Several days   8. If you checked off any problems, how difficult have these problems made it for you to do your work, take care of things at home, or get along with other people? Somewhat difficult   LAYNE-7 Score 5   Number answered (out of first 7) 7   Interpretation Mild Anxiety       Substance Use:   denies any eating disorders.  denies alcohol use.  denies marijuana use   denies illicit drugs.  denies tobacco use.    Past Psychiatric History:   Previous Psychiatric Hospitalizations: No  Previous SI/HI: No  Previous Suicide Attempts: No  Psychiatric Care (current & past): unsure  History of Psychotherapy: Yes - saw 2 in the past.   Before covid.   He was seeing the therapist 1 or twice.         Past Psychiatric Medication Trials: denies       Social History:     Raised by his parents.   Mediocre childhood due to thinking of good and bad things.  no siblings    Marital Status: single, not in a relationship  Children: no  Education: avni at [employer]  Support Person: mom     Current Living Circumstances:  Lives with his parents.      Family Psychiatric History: think uncle has schizophrenia.  Mom and GM wanted to see a therapist.    Trauma History: does not want to talk about it at this time.     Legal History: denies      Current Medications:   Current Outpatient Medications   Medication Instructions    cholecalciferol (vitamin D3) (VITAMIN D3) 2,000 Units, Oral, Daily    EScitalopram oxalate (LEXAPRO) 10 MG tablet Take 1/2 tablet at nightime for 3 days, then increase to 1 tablet at nightime.       Allergies:   Review of patient's allergies indicates:  No Known Allergies    Review Of Systems:      General : NO chills or fever   Eyes: NO  visual changes   ENT: NO hearing change, nasal discharge  or sore throat   Endocrine: NO weight changes or polydipsia/polyuria   Dermatological: NO rashes   Respiratory: NO cough, shortness of breath   Cardiovascular: NO chest pain, palpitations or racing heart   Gastrointestinal: NO nausea, vomiting, constipation or diarrhea   Musculoskeletal: NO muscle pain or stiffness   Neurological: NO confusion, dizziness, headaches or tremors   Psychiatric: please see HPI    Past Medical History:     Past Medical History:   Diagnosis Date    Acne vulgaris 2/25/2020    Allergy     Asthma     Generalized anxiety disorder 2/1/2022         Past Surgical History:      has a past surgical history that includes NONE; Tympanostomy tube placement; and Circumcision.    Birth and Developmental History:     Evaluated and found noncontributory.    Current Evaluation:       Constitutional  Vitals:  There were no vitals filed for this visit.   General:  unremarkable, age appropriate     Musculoskeletal  Muscle Strength/Tone:  no tremor, no tic   Gait & Station:  non-ataxic     Mental Status Exam:  Comment:glasses.  Arms crossed. Fair eye contact.     Appearance: of stated age Casually dressed  Behavior:  cooperative   Psychomotor: Within Normal Limits   Speech:  normal rate, rhythm and volume  Mood:  Sometimes slighly happy sometimes slightly worried soemtimes pissed off.  Mostly duenas.     Affect:  constricted  Thought Process:  within normal limits  Associations: intact  Thought Content:  NO si/hi   Thought Perceptions: no a/v hallucinations.  NO delusions.   Memory:  Intact  Attention Span and Concentration:  Normal  Fund of Knowledge:  Intact  Estimate of Intelligence:  Average   Language: no abnormalities  Insight/Judgement:  Fair  Cognition:  grossly intact  Orientation:  person, place, time and situation    LABORATORY DATA  No visits with results within 1 Month(s) from this visit.   Latest known visit with results is:   Lab Visit on 02/01/2022   Component Date Value Ref Range  Status    WBC 02/01/2022 7.25  3.90 - 12.70 K/uL Final    RBC 02/01/2022 5.09  4.60 - 6.20 M/uL Final    Hemoglobin 02/01/2022 15.5  14.0 - 18.0 g/dL Final    Hematocrit 02/01/2022 47.3  40.0 - 54.0 % Final    MCV 02/01/2022 93  82 - 98 fL Final    MCH 02/01/2022 30.5  27.0 - 31.0 pg Final    MCHC 02/01/2022 32.8  32.0 - 36.0 g/dL Final    RDW 02/01/2022 12.3  11.5 - 14.5 % Final    Platelets 02/01/2022 259  150 - 450 K/uL Final    MPV 02/01/2022 10.3  9.2 - 12.9 fL Final    Immature Granulocytes 02/01/2022 0.3  0.0 - 0.5 % Final    Gran # (ANC) 02/01/2022 4.3  1.8 - 7.7 K/uL Final    Immature Grans (Abs) 02/01/2022 0.02  0.00 - 0.04 K/uL Final    Lymph # 02/01/2022 2.0  1.0 - 4.8 K/uL Final    Mono # 02/01/2022 0.7  0.3 - 1.0 K/uL Final    Eos # 02/01/2022 0.1  0.0 - 0.5 K/uL Final    Baso # 02/01/2022 0.06  0.00 - 0.20 K/uL Final    nRBC 02/01/2022 0  0 /100 WBC Final    Gran % 02/01/2022 59.9  38.0 - 73.0 % Final    Lymph % 02/01/2022 28.0  18.0 - 48.0 % Final    Mono % 02/01/2022 9.9  4.0 - 15.0 % Final    Eosinophil % 02/01/2022 1.1  0.0 - 8.0 % Final    Basophil % 02/01/2022 0.8  0.0 - 1.9 % Final    Differential Method 02/01/2022 Automated   Final    Sodium 02/01/2022 140  136 - 145 mmol/L Final    Potassium 02/01/2022 4.0  3.5 - 5.1 mmol/L Final    Chloride 02/01/2022 103  95 - 110 mmol/L Final    CO2 02/01/2022 28  23 - 29 mmol/L Final    Glucose 02/01/2022 89  70 - 110 mg/dL Final    BUN 02/01/2022 11  6 - 20 mg/dL Final    Creatinine 02/01/2022 0.9  0.5 - 1.4 mg/dL Final    Calcium 02/01/2022 9.7  8.7 - 10.5 mg/dL Final    Total Protein 02/01/2022 7.9  6.0 - 8.4 g/dL Final    Albumin 02/01/2022 4.2  3.5 - 5.2 g/dL Final    Total Bilirubin 02/01/2022 0.5  0.1 - 1.0 mg/dL Final    Alkaline Phosphatase 02/01/2022 87  55 - 135 U/L Final    AST 02/01/2022 17  10 - 40 U/L Final    ALT 02/01/2022 15  10 - 44 U/L Final    Anion Gap 02/01/2022 9  8 - 16 mmol/L Final    eGFR  if  02/01/2022 >60.0  >60 mL/min/1.73 m^2 Final    eGFR if non African American 02/01/2022 >60.0  >60 mL/min/1.73 m^2 Final    TSH 02/01/2022 1.734  0.400 - 4.000 uIU/mL Final    Free T4 02/01/2022 1.01  0.71 - 1.51 ng/dL Final                 Assessment - Diagnosis - Goals:     Assessment and Diagnosis     Status/Progress: Based on the examination today, the patient's problem(s) is/are not improving. New problems have not presented today. Co-morbidities are not complicating management of the primary condition. There are active rule-out diagnoses for this patient at this time.      1. Generalized anxiety disorder     2. Depression, unspecified depression type          Interventions/Recommendations/Plan:    PROBLEM:anxiety  COMMENT:baseline  PLAN: will increase Lexapro 10mg qhs to 15 to target anxiety/depressoin  Continue seeing ms. Sahu.     PROBLEM:depression  COMMENT:baseline  PLAN:see above plan.     PROBLEM:sleep  COMMENT:baseline  PLAN: will recommend good sleep hygiene.   No more than 45 minutes nap.   Recommend otc melatonin.  If that does not work, contact this provider        Return to Clinic: 4 weeks         SAFETY: plan discussed with patient. Abstain from drug/etoh/tobacco use. Patient to have no access to guns/ weapons. If any suicidal or homicidal ideation or plan, or new or worsening symptoms, call 911/go to ED. Risks, benefits , and alternatives to treatment discussed with patient and guardian who demonstrated understanding and agreement and chose to treat. Patient will call if any questions or concerns. Continue regular follow up with PCP for all non-psychiatric medical conditions.        Lanhuong Nguyen DO [facility]    PSYCHOTHERAPY ADD-ON [phone]     [facility]  Time: 18 minutes  Participants: Met with patient    Therapeutic Intervention Type: insight oriented psychotherapy, behavior modifying psychotherapy, supportive  psychotherapy  Why chosen therapy is appropriate versus another modality: relevant to diagnosis, patient responds to this modality, evidence based practice    Target symptoms: anxiety , stress    Outcome monitoring methods: self-report, observation, checklist/rating scale    Patient's response to intervention:  The patient's response to intervention is accepting.    Progress toward goals:  The patient's progress toward goals is limited.    Mauricio Ferraro

## 2022-04-10 ENCOUNTER — PATIENT MESSAGE (OUTPATIENT)
Dept: PSYCHIATRY | Facility: CLINIC | Age: 22
End: 2022-04-10
Payer: COMMERCIAL

## 2022-04-13 ENCOUNTER — TELEPHONE (OUTPATIENT)
Dept: PSYCHIATRY | Facility: CLINIC | Age: 22
End: 2022-04-13
Payer: COMMERCIAL

## 2022-04-13 ENCOUNTER — DOCUMENTATION ONLY (OUTPATIENT)
Dept: PSYCHIATRY | Facility: CLINIC | Age: 22
End: 2022-04-13
Payer: COMMERCIAL

## 2022-04-13 NOTE — PROGRESS NOTES
MA called [facility] pharmacy to see if patient picked up his prescription of Lexapro. It has been filled, but patient has not picked it up.

## 2022-04-20 ENCOUNTER — TELEPHONE (OUTPATIENT)
Dept: PSYCHIATRY | Facility: CLINIC | Age: 22
End: 2022-04-20
Payer: COMMERCIAL

## 2022-04-20 ENCOUNTER — OFFICE VISIT (OUTPATIENT)
Dept: PSYCHIATRY | Facility: CLINIC | Age: 22
End: 2022-04-20
Payer: COMMERCIAL

## 2022-04-20 VITALS
HEART RATE: 85 BPM | BODY MASS INDEX: 25.07 KG/M2 | WEIGHT: 148.38 LBS | DIASTOLIC BLOOD PRESSURE: 74 MMHG | SYSTOLIC BLOOD PRESSURE: 109 MMHG

## 2022-04-20 DIAGNOSIS — F41.1 GENERALIZED ANXIETY DISORDER: Primary | ICD-10-CM

## 2022-04-20 DIAGNOSIS — F32.A DEPRESSION, UNSPECIFIED DEPRESSION TYPE: ICD-10-CM

## 2022-04-20 PROCEDURE — 90833 PSYTX W PT W E/M 30 MIN: CPT | Mod: S$GLB,,, | Performed by: PSYCHIATRY & NEUROLOGY

## 2022-04-20 PROCEDURE — 99999 PR PBB SHADOW E&M-EST. PATIENT-LVL I: ICD-10-PCS | Mod: PBBFAC,,, | Performed by: PSYCHIATRY & NEUROLOGY

## 2022-04-20 PROCEDURE — 99214 PR OFFICE/OUTPT VISIT, EST, LEVL IV, 30-39 MIN: ICD-10-PCS | Mod: S$GLB,,, | Performed by: PSYCHIATRY & NEUROLOGY

## 2022-04-20 PROCEDURE — 90833 PR PSYCHOTHERAPY W/PATIENT W/E&M, 30 MIN (ADD ON): ICD-10-PCS | Mod: S$GLB,,, | Performed by: PSYCHIATRY & NEUROLOGY

## 2022-04-20 PROCEDURE — 99999 PR PBB SHADOW E&M-EST. PATIENT-LVL I: CPT | Mod: PBBFAC,,, | Performed by: PSYCHIATRY & NEUROLOGY

## 2022-04-20 PROCEDURE — 99214 OFFICE O/P EST MOD 30 MIN: CPT | Mod: S$GLB,,, | Performed by: PSYCHIATRY & NEUROLOGY

## 2022-04-20 RX ORDER — ESCITALOPRAM OXALATE 10 MG/1
15 TABLET ORAL DAILY
Qty: 45 TABLET | Refills: 3 | Status: SHIPPED | OUTPATIENT
Start: 2022-04-20 | End: 2022-06-08 | Stop reason: SDUPTHER

## 2022-04-20 NOTE — TELEPHONE ENCOUNTER
Called for recent fill/pick-up dates for prozac - per Pharmacist Kiesha the med was picked up on 4/16 at 2:22pm    (at first call today I was given conflicting info so I called back and Kiesha corrected the pick-up date)    She also said in March the med was picked up on 4/13

## 2022-05-05 ENCOUNTER — TELEPHONE (OUTPATIENT)
Dept: INTERNAL MEDICINE | Facility: CLINIC | Age: 22
End: 2022-05-05
Payer: COMMERCIAL

## 2022-05-05 ENCOUNTER — PATIENT MESSAGE (OUTPATIENT)
Dept: INTERNAL MEDICINE | Facility: CLINIC | Age: 22
End: 2022-05-05
Payer: COMMERCIAL

## 2022-05-05 NOTE — TELEPHONE ENCOUNTER
Patient was in clinic today and is requesting a letter from Dr. Oquendo for his school regarding his psychiatric diagnosis.

## 2022-05-06 ENCOUNTER — PATIENT MESSAGE (OUTPATIENT)
Dept: INTERNAL MEDICINE | Facility: CLINIC | Age: 22
End: 2022-05-06
Payer: COMMERCIAL

## 2022-05-17 ENCOUNTER — TELEPHONE (OUTPATIENT)
Dept: PSYCHIATRY | Facility: CLINIC | Age: 22
End: 2022-05-17

## 2022-05-18 ENCOUNTER — PATIENT MESSAGE (OUTPATIENT)
Dept: PSYCHIATRY | Facility: CLINIC | Age: 22
End: 2022-05-18
Payer: COMMERCIAL

## 2022-05-26 NOTE — PROGRESS NOTES
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      Site:  [facility]    Salomón Olson is a 22 y.o.  single male with a past psychiatric history of LAYNE and unspecified Depression Who presents for follow up.   Last seen on 4/20/2022  At that visit, these are the recommendations:  will increase Lexapro 10mg qhs to 15 to target anxiety/depressoin  The patient is seeing Ms. Sahu for therapy.       History of Present Illness:       Positive changes like previously he would talk to his mom and he would freeze but now he can open up to mom.  Take the lexapro more in the afternoon due to not able to sleep.    Once it was changed, sleep improved a little.   Plan : wants to return back to school.    If he can work on the sleeping and eating habits, better communicating at home, then school will be better.     Felt that whatever was going at home was affecting his school.  He felt like his relationship with his parents was a stressor.   Recently it is getting better due to the patient openly up.  Had trouble communicating to them that was frustrating to his parents.   Staying at home,  Feels he is more active,   family have been going out more.  Working on home projects.      Depression has improved with the anxiety.  Depression comes and goes and it lasts a few days.   Denies si/hi.   Denies a/v hallucinations.   No new allergies. No new medical conditions. No new surgeries.  Since the last visit.        He is open to possibly family therapy.  They will be ok with that.  They discussed about it before.  After 1 month ago seeing us, he was airing out. That was 1st time.    Personally he feels like he is level headed.     His dad will repeat certain things, over time he can't listen.       One relevation - back then if his parents tell him he needs  to work, he never took the actions to do it.    subsciously he would tell himself everything was fine.  Until recently, it has gotten worse      PHQ-9 Depression Patient Health Questionnaire 6/7/2022   Patient agreed to terms: Yes   Little interest or pleasure in doing things 1   Feeling down, depressed, or hopeless 1   Trouble falling or staying asleep, or sleeping too much 2   Feeling tired or having little energy 2   Poor appetite or overeating 1   Feeling bad about yourself - or that you are a failure or have let yourself or your family down 1   Trouble concentrating on things, such as reading the newspaper or watching television 1   Moving or speaking so slowly that other people could have noticed. Or the opposite - being so fidgety or restless that you have been moving around a lot more than usual 0   Thoughts that you would be better off dead, or of hurting yourself in some way 0   PHQ-9 Total Score 9   If you checked off any problems, how difficult have these problems made it for you to do your work, take care of things at home, or get along with other people? Somewhat difficult   Interpretation Mild     LAYNE-7 6/7/2022   Was test performed?    1. Feeling nervous, anxious, or on edge? Several days   2. Not being able to stop or control worrying? Several days   3. Worrying too much about different things? Several days   4. Trouble relaxing? Several days   5. Being so restless that it is hard to sit still? Not at all   6. Becoming easily annoyed or irritable? Several days   7. Feeling afraid as if something awful might happen? Not at all   8. If you checked off any problems, how difficult have these problems made it for you to do your work, take care of things at home, or get along with other people?    LAYNE-7 Score 5   Number answered (out of first 7) 7   Interpretation Mild Anxiety       Substance Use:   denies any eating disorders.  denies alcohol use.  denies marijuana use   denies illicit drugs.  denies  tobacco use.    Past Psychiatric History:   Previous Psychiatric Hospitalizations: No  Previous SI/HI: No  Previous Suicide Attempts: No  Psychiatric Care (current & past): unsure  History of Psychotherapy: Yes - saw 2 in the past.   Before covid.   He was seeing the therapist 1 or twice.         Past Psychiatric Medication Trials: denies       Social History:     Raised by his parents.   Mediocre childhood due to thinking of good and bad things.  no siblings    Marital Status: single, not in a relationship  Children: no  Education: avni at \[employer]\""  Support Person: mom     Current Living Circumstances:  Lives with his parents.      Family Psychiatric History: think uncle has schizophrenia.  Mom and GM wanted to see a therapist.    Trauma History: does not want to talk about it at this time.     Legal History: denies      Current Medications:   Current Outpatient Medications   Medication Instructions    cholecalciferol (vitamin D3) (VITAMIN D3) 2,000 Units, Oral, Daily    EScitalopram oxalate (LEXAPRO) 15 mg, Oral, Daily       Allergies:   Review of patient's allergies indicates:  No Known Allergies    Review Of Systems:      General : NO chills or fever   Eyes: NO  visual changes   ENT: NO hearing change, nasal discharge or sore throat   Endocrine: NO weight changes or polydipsia/polyuria   Dermatological: NO rashes   Respiratory: NO cough, shortness of breath   Cardiovascular: NO chest pain, palpitations or racing heart   Gastrointestinal: NO nausea, vomiting, constipation or diarrhea   Musculoskeletal: NO muscle pain or stiffness   Neurological: NO confusion, dizziness, headaches or tremors   Psychiatric: please see HPI    Past Medical History:     Past Medical History:   Diagnosis Date    Acne vulgaris 2/25/2020    Allergy     Asthma     Generalized anxiety disorder 2/1/2022         Past Surgical History:      has a past surgical history that includes NONE; Tympanostomy tube placement; and  Circumcision.    Birth and Developmental History:     Evaluated and found noncontributory.    Current Evaluation:       Constitutional  Vitals:  Vitals:    06/08/22 1357   BP: 130/84   Pulse: 109   Weight: 68.6 kg (151 lb 5.5 oz)      General:  unremarkable, age appropriate     Musculoskeletal  Muscle Strength/Tone:  no tremor, no tic   Gait & Station:  non-ataxic     Mental Status Exam:  Comment:glasses.  Cross his arms.  Fair eye contact.   More open  Appearance: of stated age Casually dressed  Behavior:  cooperative   Psychomotor: Within Normal Limits   Speech:  normal rate, rhythm and volume  Mood:    A little bit better,    Affect:  constricted  Thought Process:  within normal limits  Associations: intact  Thought Content:  NO si/hi   Thought Perceptions: no a/v hallucinations.  NO delusions.   Memory:  Intact  Attention Span and Concentration:  Normal  Fund of Knowledge:  Intact  Estimate of Intelligence:  Average   Language: no abnormalities  Insight/Judgement:  Fair  Cognition:  grossly intact  Orientation:  person, place, time and situation    LABORATORY DATA  No visits with results within 1 Month(s) from this visit.   Latest known visit with results is:   Lab Visit on 02/01/2022   Component Date Value Ref Range Status    WBC 02/01/2022 7.25  3.90 - 12.70 K/uL Final    RBC 02/01/2022 5.09  4.60 - 6.20 M/uL Final    Hemoglobin 02/01/2022 15.5  14.0 - 18.0 g/dL Final    Hematocrit 02/01/2022 47.3  40.0 - 54.0 % Final    MCV 02/01/2022 93  82 - 98 fL Final    MCH 02/01/2022 30.5  27.0 - 31.0 pg Final    MCHC 02/01/2022 32.8  32.0 - 36.0 g/dL Final    RDW 02/01/2022 12.3  11.5 - 14.5 % Final    Platelets 02/01/2022 259  150 - 450 K/uL Final    MPV 02/01/2022 10.3  9.2 - 12.9 fL Final    Immature Granulocytes 02/01/2022 0.3  0.0 - 0.5 % Final    Gran # (ANC) 02/01/2022 4.3  1.8 - 7.7 K/uL Final    Immature Grans (Abs) 02/01/2022 0.02  0.00 - 0.04 K/uL Final    Lymph # 02/01/2022 2.0  1.0 - 4.8 K/uL  Final    Mono # 02/01/2022 0.7  0.3 - 1.0 K/uL Final    Eos # 02/01/2022 0.1  0.0 - 0.5 K/uL Final    Baso # 02/01/2022 0.06  0.00 - 0.20 K/uL Final    nRBC 02/01/2022 0  0 /100 WBC Final    Gran % 02/01/2022 59.9  38.0 - 73.0 % Final    Lymph % 02/01/2022 28.0  18.0 - 48.0 % Final    Mono % 02/01/2022 9.9  4.0 - 15.0 % Final    Eosinophil % 02/01/2022 1.1  0.0 - 8.0 % Final    Basophil % 02/01/2022 0.8  0.0 - 1.9 % Final    Differential Method 02/01/2022 Automated   Final    Sodium 02/01/2022 140  136 - 145 mmol/L Final    Potassium 02/01/2022 4.0  3.5 - 5.1 mmol/L Final    Chloride 02/01/2022 103  95 - 110 mmol/L Final    CO2 02/01/2022 28  23 - 29 mmol/L Final    Glucose 02/01/2022 89  70 - 110 mg/dL Final    BUN 02/01/2022 11  6 - 20 mg/dL Final    Creatinine 02/01/2022 0.9  0.5 - 1.4 mg/dL Final    Calcium 02/01/2022 9.7  8.7 - 10.5 mg/dL Final    Total Protein 02/01/2022 7.9  6.0 - 8.4 g/dL Final    Albumin 02/01/2022 4.2  3.5 - 5.2 g/dL Final    Total Bilirubin 02/01/2022 0.5  0.1 - 1.0 mg/dL Final    Alkaline Phosphatase 02/01/2022 87  55 - 135 U/L Final    AST 02/01/2022 17  10 - 40 U/L Final    ALT 02/01/2022 15  10 - 44 U/L Final    Anion Gap 02/01/2022 9  8 - 16 mmol/L Final    eGFR if African American 02/01/2022 >60.0  >60 mL/min/1.73 m^2 Final    eGFR if non African American 02/01/2022 >60.0  >60 mL/min/1.73 m^2 Final    TSH 02/01/2022 1.734  0.400 - 4.000 uIU/mL Final    Free T4 02/01/2022 1.01  0.71 - 1.51 ng/dL Final                 Assessment - Diagnosis - Goals:     Assessment and Diagnosis     Status/Progress: Based on the examination today, the patient's problem(s) is/are improving. New problems have not presented today. Co-morbidities are not complicating management of the primary condition. There are active rule-out diagnoses for this patient at this time.      1. Generalized anxiety disorder     2. Depression, unspecified depression type     3. Insomnia,  unspecified type          Interventions/Recommendations/Plan:    PROBLEM:anxiety  COMMENT:improving  PLAN: will continue Lexapro 15 daily to target anxiety/depressoin  Continue seeing ms. Sahu.     PROBLEM:depression  COMMENT:improving  PLAN:see above plan.     PROBLEM:sleep  COMMENT:baseline  PLAN: will recommend taking melatonin. If that does not work to call provider.     PROBLEM:family dynamics  COMMENT:baseline  PLAN: will recommend family therapy with Mercedes Onofre.          Return to Clinic: 2 months        SAFETY: plan discussed with patient. Abstain from drug/etoh/tobacco use. Patient to have no access to guns/ weapons. If any suicidal or homicidal ideation or plan, or new or worsening symptoms, call 911/go to ED. Risks, benefits , and alternatives to treatment discussed with patient and guardian who demonstrated understanding and agreement and chose to treat. Patient will call if any questions or concerns. Continue regular follow up with PCP for all non-psychiatric medical conditions.        Lanhuong Nguyen DO [facility]    PSYCHOTHERAPY ADD-ON [phone]     [facility]  Time: 16 minutes  Participants: Met with patient    Therapeutic Intervention Type: insight oriented psychotherapy, behavior modifying psychotherapy, supportive psychotherapy  Why chosen therapy is appropriate versus another modality: relevant to diagnosis, patient responds to this modality, evidence based practice    Target symptoms: anxiety , family dynamics    Outcome monitoring methods: self-report, observation, checklist/rating scale    Patient's response to intervention:  The patient's response to intervention is accepting.    Progress toward goals:  The patient's progress toward goals is limited.    Mauricio Ferraro

## 2022-05-30 ENCOUNTER — OFFICE VISIT (OUTPATIENT)
Dept: PSYCHIATRY | Facility: CLINIC | Age: 22
End: 2022-05-30
Payer: COMMERCIAL

## 2022-05-30 DIAGNOSIS — F41.1 GENERALIZED ANXIETY DISORDER: Primary | ICD-10-CM

## 2022-05-30 DIAGNOSIS — F32.A DEPRESSION, UNSPECIFIED DEPRESSION TYPE: ICD-10-CM

## 2022-05-30 PROCEDURE — 90837 PSYTX W PT 60 MINUTES: CPT | Mod: 95,,, | Performed by: SOCIAL WORKER

## 2022-05-30 PROCEDURE — 90837 PR PSYCHOTHERAPY W/PATIENT, 60 MIN: ICD-10-PCS | Mod: 95,,, | Performed by: SOCIAL WORKER

## 2022-05-30 NOTE — PROGRESS NOTES
Individual Psychotherapy Follow-up Visit Progress Note (PhD/LCSW)     Outpatient Psychotherapy - 60 minutes with patient (53 minutes or more) - [id number]    Date: 05/30/2022    Visit Type: Telehealth    Due to the nature of this visit type, a virtual visit with synchronous audio and video, each patient to whom this provider administers behavioral health services by telemedicine is: (1) informed of the relationship between the provider and patient and the respective role of any other health care provider with respect to management of the patient; and (2) notified that he or she may decline to receive services by telemedicine and may withdraw from such care at any time.    The patient was informed of the following:     Provider's contact info:  [facility]  [address] [place] [address], [address] Floor, [address]  [place] [address]  (Phone) [phone]    If technology issues occur, call office phone: Ph: [phone]  If crisis: Dial 911 or go to nearest Emergency Room (ER)  If questions related to privacy practices: contact [facility] Information Department: [phone]    For security purposes, the pt identified that they were at [address] [place] [address] during today's session and contact number is [phone] (home) .    The pt's emergency contact(s) is Extended Emergency Contact Information  Primary Emergency Contact: Samuel Olson  Address: [address] [place] [id number] [facility]  Home Phone: [phone]  Mobile Phone: [phone]  Relation: Father.    Crisis Disclaimer: Patient was informed that due to the virtual nature of the visit, that if a crisis develops, protocols will be implemented to ensure patient safety, including but not limited to: 1) Initiating a welfare check with local law enforcement and/or 2) Calling 911    5/30/2022  MRN: [id number]  Primary Care Provider: MD Salomón Reed is a  "22 y.o. male who presents today for follow-up of depression and anxiety. Met with patient.      Preferred Name: Salomón     Subjective:       Content of Current Session: Pt reported, "I've been feeling ok" upon entry to this session. LCSW utilized active listening/reflection to engage with pt and review experiences since their last session with this provider. Pt reported "I feel like one of the main issues I've been dealing with is the parent-child relationship." Pt reported "I feel like that's (parents) one of my main problems with my stress." Pt reported "only recently I've figured out that I've spent a lot of my time telling myself 'it's fine' and not dealing with whatever is stressing me out and my parents don't understand that." Pt reported "I've also managed to talk with my mom and then my dad almost like a family meeting for the first time in a really long time and we actually talked." Pt reported his positive experience resulted in "I did a couple of things that I needed to do after that." Pt noted "my dad had a bad response towards the positive things I did in those couple of days and I left the house for a week to stay with my grandparents." LCSW praised pt's skill application and positive progress. LCSW utilized cognitive processing and supportive therapy. LCSW discussed manifestations of anxiety (fight, flight, freeze, arjun) and their impact on behaviors. LCSW discussed CBT model of behaviors and the impact of citing triggers in changing behavioral/emotional response. LCSW discussed interpersonal therapy skills to improve his communication skills with others. LCSW utilized strengths based perspective to empower pt and edify his confidence. LCSW and pt briefly reviewed his coping skills. Pt responded appropriately to aforementioned interventions. Pt denied SI/HI/AVH.     Therapeutic Interventions Utilized During Current Session: Cognitive Processing Therapy, Cognitive Behavioral Therapy, Internal Family " Systems Therapy, Interpersonal Psychotherapy, Reality Therapy, Strength-Based Therapy, Supportive Therapy    GAD7 5/30/2022 4/20/2022 3/15/2022 3/10/2022 2/22/2022 2/1/2022   1. Feeling nervous, anxious, or on edge? 0 1 1 1 1 1   2. Not being able to stop or control worrying? 1 1 1 1 1 2   3. Worrying too much about different things? 1 1 1 1 1 2   4. Trouble relaxing? 1 0 1 1 1 1   5. Being so restless that it is hard to sit still? 0 0 1 1 0 0   6. Becoming easily annoyed or irritable? 0 1 1 1 1 1   7. Feeling afraid as if something awful might happen? 0 1 1 1 1 0   8. If you checked off any problems, how difficult have these problems made it for you to do your work, take care of things at home, or get along with other people? - 1 1 - - 1   LAYNE-7 Score 3 5 7 7 6 7      0-4 = Minimal anxiety  5-9 = Mild anxiety  10-14 = Moderate anxiety  15-21 = Severe anxiety     PHQ-9 Depression Patient Health Questionnaire 5/30/2022 3/10/2022 2/22/2022   Patient agreed to terms: Yes Yes Yes   Little interest or pleasure in doing things 0 1 1   Feeling down, depressed, or hopeless 0 1 1   Trouble falling or staying asleep, or sleeping too much 2 2 2   Feeling tired or having little energy 2 2 2   Poor appetite or overeating 2 2 1   Feeling bad about yourself - or that you are a failure or have let yourself or your family down 1 2 2   Trouble concentrating on things, such as reading the newspaper or watching television 0 1 1   Moving or speaking so slowly that other people could have noticed. Or the opposite - being so fidgety or restless that you have been moving around a lot more than usual 0 0 0   Thoughts that you would be better off dead, or of hurting yourself in some way 0 0 1   PHQ-9 Total Score 7 11 11   If you checked off any problems, how difficult have these problems made it for you to do your work, take care of things at home, or get along with other people? Somewhat difficult Somewhat difficult Very difficult    Interpretation Mild Moderate Moderate     0-4 = No intervention  5 to 9 = Mild  10 to 14 = Moderate  15 to 19 = Moderately severe  ?20 = Severe      Objective:       Mental Status Evaluation  Appearance: unremarkable, age appropriate  Behavior: normal, cooperative  Speech: normal tone, normal rate, normal pitch, normal volume  Mood: euthymic  Affect: congruent and appropriate  Thought Process: normal and logical  Thought Content: normal, no suicidality, no homicidality, delusions, or paranoia  Sensorium: grossly intact  Cognition: grossly intact  Insight: intact  Judgment: adequate to circumstances    Risk parameters:  Patient reports no suicidal ideation  Patient reports no homicidal ideation  Patient reports no self-injurious behavior  Patient reports no violent behavior      Assessment & Plan:     The patient's response to the interventions is accepting    The patient's progress toward treatment goals is limited    Homework assigned: none     Treatment plan:   A. Target symptoms: Depression and Anxiety   B. Therapeutic modalities: insight oriented psychotherapy, behavior modifying psychotherapy, supportive psychotherapy  C. Why chosen therapy is appropriate versus another modality: relevant to diagnosis, patient responds to this modality, evidence based practice   D. Outcome monitoring methods: self report, observation, rating scales, feedback from clinical staff      Visit Diagnosis:   1. Generalized anxiety disorder    2. Depression, unspecified depression type        Follow-up: individual psychotherapy    Return to Clinic: 3 weeks  Pt Reported to Schedule Self via Epic EMR MyChart Application and/or Department Support Staff          Adelaide Sahu LCSW  05/30/2022   3:00 PM       used

## 2022-06-08 ENCOUNTER — OFFICE VISIT (OUTPATIENT)
Dept: PSYCHIATRY | Facility: CLINIC | Age: 22
End: 2022-06-08
Payer: COMMERCIAL

## 2022-06-08 VITALS
DIASTOLIC BLOOD PRESSURE: 84 MMHG | HEART RATE: 109 BPM | WEIGHT: 151.38 LBS | BODY MASS INDEX: 25.58 KG/M2 | SYSTOLIC BLOOD PRESSURE: 130 MMHG

## 2022-06-08 DIAGNOSIS — G47.00 INSOMNIA, UNSPECIFIED TYPE: ICD-10-CM

## 2022-06-08 DIAGNOSIS — F32.A DEPRESSION, UNSPECIFIED DEPRESSION TYPE: ICD-10-CM

## 2022-06-08 DIAGNOSIS — F41.1 GENERALIZED ANXIETY DISORDER: Primary | ICD-10-CM

## 2022-06-08 PROCEDURE — 99214 OFFICE O/P EST MOD 30 MIN: CPT | Mod: S$GLB,,, | Performed by: PSYCHIATRY & NEUROLOGY

## 2022-06-08 PROCEDURE — 3079F PR MOST RECENT DIASTOLIC BLOOD PRESSURE 80-89 MM HG: ICD-10-PCS | Mod: CPTII,S$GLB,, | Performed by: PSYCHIATRY & NEUROLOGY

## 2022-06-08 PROCEDURE — 90833 PR PSYCHOTHERAPY W/PATIENT W/E&M, 30 MIN (ADD ON): ICD-10-PCS | Mod: S$GLB,,, | Performed by: PSYCHIATRY & NEUROLOGY

## 2022-06-08 PROCEDURE — 99214 PR OFFICE/OUTPT VISIT, EST, LEVL IV, 30-39 MIN: ICD-10-PCS | Mod: S$GLB,,, | Performed by: PSYCHIATRY & NEUROLOGY

## 2022-06-08 PROCEDURE — 3075F SYST BP GE 130 - 139MM HG: CPT | Mod: CPTII,S$GLB,, | Performed by: PSYCHIATRY & NEUROLOGY

## 2022-06-08 PROCEDURE — 3075F PR MOST RECENT SYSTOLIC BLOOD PRESS GE 130-139MM HG: ICD-10-PCS | Mod: CPTII,S$GLB,, | Performed by: PSYCHIATRY & NEUROLOGY

## 2022-06-08 PROCEDURE — 3008F PR BODY MASS INDEX (BMI) DOCUMENTED: ICD-10-PCS | Mod: CPTII,S$GLB,, | Performed by: PSYCHIATRY & NEUROLOGY

## 2022-06-08 PROCEDURE — 3008F BODY MASS INDEX DOCD: CPT | Mod: CPTII,S$GLB,, | Performed by: PSYCHIATRY & NEUROLOGY

## 2022-06-08 PROCEDURE — 90833 PSYTX W PT W E/M 30 MIN: CPT | Mod: S$GLB,,, | Performed by: PSYCHIATRY & NEUROLOGY

## 2022-06-08 PROCEDURE — 99999 PR PBB SHADOW E&M-EST. PATIENT-LVL II: ICD-10-PCS | Mod: PBBFAC,,, | Performed by: PSYCHIATRY & NEUROLOGY

## 2022-06-08 PROCEDURE — 99999 PR PBB SHADOW E&M-EST. PATIENT-LVL II: CPT | Mod: PBBFAC,,, | Performed by: PSYCHIATRY & NEUROLOGY

## 2022-06-08 PROCEDURE — 3079F DIAST BP 80-89 MM HG: CPT | Mod: CPTII,S$GLB,, | Performed by: PSYCHIATRY & NEUROLOGY

## 2022-06-08 RX ORDER — ESCITALOPRAM OXALATE 10 MG/1
15 TABLET ORAL DAILY
Qty: 45 TABLET | Refills: 5 | Status: SHIPPED | OUTPATIENT
Start: 2022-06-08 | End: 2022-11-14

## 2022-06-13 ENCOUNTER — OFFICE VISIT (OUTPATIENT)
Dept: PSYCHIATRY | Facility: CLINIC | Age: 22
End: 2022-06-13
Payer: COMMERCIAL

## 2022-06-13 DIAGNOSIS — F41.1 GENERALIZED ANXIETY DISORDER: Primary | ICD-10-CM

## 2022-06-13 DIAGNOSIS — F32.A DEPRESSION, UNSPECIFIED DEPRESSION TYPE: ICD-10-CM

## 2022-06-13 PROCEDURE — 90837 PR PSYCHOTHERAPY W/PATIENT, 60 MIN: ICD-10-PCS | Mod: 95,,, | Performed by: SOCIAL WORKER

## 2022-06-13 PROCEDURE — 90837 PSYTX W PT 60 MINUTES: CPT | Mod: 95,,, | Performed by: SOCIAL WORKER

## 2022-06-13 NOTE — PROGRESS NOTES
Individual Psychotherapy Follow-up Visit Progress Note (PhD/LCSW)     Outpatient Psychotherapy - 60 minutes with patient (53 minutes or more) - [id number]    Date: 06/13/2022    Visit Type: Telehealth    Due to the nature of this visit type, a virtual visit with synchronous audio and video, each patient to whom this provider administers behavioral health services by telemedicine is: (1) informed of the relationship between the provider and patient and the respective role of any other health care provider with respect to management of the patient; and (2) notified that he or she may decline to receive services by telemedicine and may withdraw from such care at any time.    The patient was informed of the following:     Provider's contact info:  [facility]  [id number] [place] [address], 3rd Floor, [address]  [place] [address]  (Phone) [phone]    If technology issues occur, call office phone: Ph: [phone]  If crisis: Dial 911 or go to nearest Emergency Room (ER)  If questions related to privacy practices: contact [facility] Information Department: [phone]    For security purposes, the pt identified that they were at [address] [place] [id number] during today's session and contact number is [phone] (home) .    The pt's emergency contact(s) is Extended Emergency Contact Information  Primary Emergency Contact: Samuel Olson  Address: [address] [place] [id number] [facility]  Home Phone: [phone]  Mobile Phone: [phone]  Relation: Father.    Crisis Disclaimer: Patient was informed that due to the virtual nature of the visit, that if a crisis develops, protocols will be implemented to ensure patient safety, including but not limited to: 1) Initiating a welfare check with local law enforcement and/or 2) Calling 911    6/13/2022  MRN: [id number]  Primary Care Provider: MD Salomón Reed is a  "22 y.o. male who presents today for follow-up of depression and anxiety. Met with patient.      Preferred Name: Salomón     Subjective:       Content of Current Session: Pt reported, "I've been alright" upon entry to this session. LCSW utilized active listening/reflection to engage with pt and review experiences since their last session with this provider. Pt endorsed his anxiety and depression has improved since his last session with this provider. Pt noted "everything is going well right now." Pt reported he had still been struggling with anhedonia and decreased socialization. LCSW discussed the impact of socialization in decreasing the acuity of his anxiety/depression. LCSW discussed increasing his social stamina and motivation through frequent, small social interactions on a consistent basis. Pt endorsed historically interacting with people virtually through online jose. Pt noted, "but I've noticed within the past week I've been feeling more restless and I have to get up and walk around." LCSW discussed utilizing physical ambulation as a redirection method for his restlessness and improve his socialization skills. LCSW reviewed interpersonal skills therapy to improve pt's socialization skills. LCSW and pt discussed various areas of socialization opportunity with his weekly routine. LCSW encouraged pt to brainstorm ideas for his socialization opportunities and utilize his socialization skills for review in his upcoming appt with this provider. Pt responded appropriately to aforementioned interventions. Pt denied SI/HI/AVH.     Therapeutic Interventions Utilized During Current Session: Cognitive Processing Therapy, Cognitive Behavioral Therapy, Interpersonal Psychotherapy, Supportive Therapy    GAD7 6/13/2022 6/7/2022 5/30/2022 4/20/2022 3/15/2022 3/10/2022 2/22/2022   1. Feeling nervous, anxious, or on edge? 1 1 0 1 1 1 1   2. Not being able to stop or control worrying? 1 1 1 1 1 1 1   3. Worrying too much about " different things? 1 1 1 1 1 1 1   4. Trouble relaxing? 1 1 1 0 1 1 1   5. Being so restless that it is hard to sit still? 1 0 0 0 1 1 0   6. Becoming easily annoyed or irritable? 1 1 0 1 1 1 1   7. Feeling afraid as if something awful might happen? 0 0 0 1 1 1 1   8. If you checked off any problems, how difficult have these problems made it for you to do your work, take care of things at home, or get along with other people? - - - 1 1 - -   LAYNE-7 Score 6 5 3 5 7 7 6      0-4 = Minimal anxiety  5-9 = Mild anxiety  10-14 = Moderate anxiety  15-21 = Severe anxiety     PHQ-9 Depression Patient Health Questionnaire 6/13/2022 6/7/2022 5/30/2022 3/10/2022 2/22/2022   Patient agreed to terms: Yes Yes Yes Yes Yes   Little interest or pleasure in doing things 1 1 0 1 1   Feeling down, depressed, or hopeless 0 1 0 1 1   Trouble falling or staying asleep, or sleeping too much 1 2 2 2 2   Feeling tired or having little energy 2 2 2 2 2   Poor appetite or overeating 2 1 2 2 1   Feeling bad about yourself - or that you are a failure or have let yourself or your family down 1 1 1 2 2   Trouble concentrating on things, such as reading the newspaper or watching television 1 1 0 1 1   Moving or speaking so slowly that other people could have noticed. Or the opposite - being so fidgety or restless that you have been moving around a lot more than usual 0 0 0 0 0   Thoughts that you would be better off dead, or of hurting yourself in some way 0 0 0 0 1   PHQ-9 Total Score 8 9 7 11 11   If you checked off any problems, how difficult have these problems made it for you to do your work, take care of things at home, or get along with other people? Somewhat difficult Somewhat difficult Somewhat difficult Somewhat difficult Very difficult   Interpretation Mild Mild Mild Moderate Moderate     0-4 = No intervention  5 to 9 = Mild  10 to 14 = Moderate  15 to 19 = Moderately severe  ?20 = Severe      Objective:       Mental Status  Evaluation  Appearance: unremarkable, age appropriate  Behavior: normal, cooperative  Speech: normal tone, normal rate, normal pitch, normal volume  Mood: euthymic  Affect: congruent and appropriate  Thought Process: normal and logical  Thought Content: normal, no suicidality, no homicidality, delusions, or paranoia  Sensorium: grossly intact  Cognition: grossly intact  Insight: intact  Judgment: adequate to circumstances    Risk parameters:  Patient reports no suicidal ideation  Patient reports no homicidal ideation  Patient reports no self-injurious behavior  Patient reports no violent behavior      Assessment & Plan:     The patient's response to the interventions is accepting    The patient's progress toward treatment goals is fair     Homework assigned: none     Treatment plan:   A. Target symptoms: Depression and Anxiety   B. Therapeutic modalities: insight oriented psychotherapy, behavior modifying psychotherapy, supportive psychotherapy  C. Why chosen therapy is appropriate versus another modality: relevant to diagnosis, patient responds to this modality, evidence based practice   D. Outcome monitoring methods: self report, observation, rating scales, feedback from clinical staff      Visit Diagnosis:   1. Generalized anxiety disorder    2. Depression, unspecified depression type        Follow-up: individual psychotherapy    Return to Clinic: 2 weeks, 3 weeks  Pt Reported to Schedule Self via Epic EMR MyChart Application and/or Department Support Staff          Adelaide Sahu, MEY  06/13/2022   2:00 PM

## 2022-07-05 ENCOUNTER — PATIENT MESSAGE (OUTPATIENT)
Dept: PSYCHIATRY | Facility: CLINIC | Age: 22
End: 2022-07-05
Payer: COMMERCIAL

## 2022-07-06 ENCOUNTER — PATIENT MESSAGE (OUTPATIENT)
Dept: PSYCHIATRY | Facility: CLINIC | Age: 22
End: 2022-07-06

## 2022-07-06 ENCOUNTER — OFFICE VISIT (OUTPATIENT)
Dept: PSYCHIATRY | Facility: CLINIC | Age: 22
End: 2022-07-06
Payer: COMMERCIAL

## 2022-07-06 DIAGNOSIS — F32.A DEPRESSION, UNSPECIFIED DEPRESSION TYPE: ICD-10-CM

## 2022-07-06 DIAGNOSIS — F41.1 GENERALIZED ANXIETY DISORDER: Primary | ICD-10-CM

## 2022-07-06 PROCEDURE — 90837 PR PSYCHOTHERAPY W/PATIENT, 60 MIN: ICD-10-PCS | Mod: 95,,, | Performed by: SOCIAL WORKER

## 2022-07-06 PROCEDURE — 90837 PSYTX W PT 60 MINUTES: CPT | Mod: 95,,, | Performed by: SOCIAL WORKER

## 2022-07-06 NOTE — PROGRESS NOTES
Individual Psychotherapy Follow-up Visit Progress Note (PhD/LCSW)     Outpatient Psychotherapy - 60 minutes with patient (53 minutes or more) - [id number]    Date: 07/06/2022    Visit Type: Telehealth    Due to the nature of this visit type, a virtual visit with synchronous audio and video, each patient to whom this provider administers behavioral health services by telemedicine is: (1) informed of the relationship between the provider and patient and the respective role of any other health care provider with respect to management of the patient; and (2) notified that he or she may decline to receive services by telemedicine and may withdraw from such care at any time.    The patient was informed of the following:     Provider's contact info:  [facility]  [address] [place] [address], [address]rd Floor, [address]  [place] [address]  (Phone) [phone]    If technology issues occur, call office phone: Ph: [phone]  If crisis: Dial 911 or go to nearest Emergency Room (ER)  If questions related to privacy practices: contact [facility] Information Department: [phone]    For security purposes, the pt identified that they were at [address] [place] [address] during today's session and contact number is [phone] (home) .    The pt's emergency contact(s) is Extended Emergency Contact Information  Primary Emergency Contact: Samuel Olson  Address: [address] [place] [address] [facility]  Home Phone: [phone]  Mobile Phone: [phone]  Relation: Father.    Crisis Disclaimer: Patient was informed that due to the virtual nature of the visit, that if a crisis develops, protocols will be implemented to ensure patient safety, including but not limited to: 1) Initiating a welfare check with local law enforcement and/or 2) Calling 911    7/6/2022  MRN: [id number]  Primary Care Provider: MD Salomón Reed is a  "22 y.o. male who presents today for follow-up of depression and anxiety. Met with patient.      Preferred Name: Salomón     Subjective:       Content of Current Session: Pt reported, "I'm ok" upon entry to this session. LCSW utilized active listening/reflection to engage with pt and review experiences since their last session with this provider. Pt reported he had recently cancelled his upcoming therapy appointment with Erica Crisostomo LCSW, due to "they told me when I was at [facility] for an appointment that she doesn't specialize in family therapy." Pt requested this provider send the names of the therapists available at [facility] location - LCSW submitted names via [employer] portal message. Pt reported he had been focusing on self-reflection and "I see that I don't really have energy or motivation but it's all dependent on what it is." Pt reported his anhedonia was contingent on his emotional investment or interest in the individual task. Pt reported "I struggle with doing what I need to be doing because I have to see the value or progress in whatever it is for it to be done." LCSW praised pt's introspection. LCSW utilized cognitive processing and supportive therapy. LCSW utilized introspective therapy and solution focused therapy to further discuss his insight and edify his critical thinking skills to identify solutions. LCSW discussed setting realistic expectations/objectives and their impact on attaining a sense of accomplishment and overall goals. LCSW discussed utilization of structure and routine in improving his energy and anhedonia. LCSW discussed creating a structured sleep pattern to improve his anxiety and depression response. LCSW discussed Solfeggio frequency therapy and binaural frequency therapy as supports in decreasing his insomnia. LCSW and pt briefly reviewed his historical coping skills. Pt responded appropriately to aforementioned interventions. Pt denied SI/HI/AVH.     Therapeutic " Interventions Utilized During Current Session: Cognitive Processing Therapy, Cognitive Behavioral Therapy, Solution Focused Therapy, Supportive Therapy, Introspective Therapy    GAD7 7/6/2022 6/13/2022 6/7/2022 5/30/2022 4/20/2022 3/15/2022 3/10/2022   1. Feeling nervous, anxious, or on edge? 0 1 1 0 1 1 1   2. Not being able to stop or control worrying? 0 1 1 1 1 1 1   3. Worrying too much about different things? 1 1 1 1 1 1 1   4. Trouble relaxing? 1 1 1 1 0 1 1   5. Being so restless that it is hard to sit still? 1 1 0 0 0 1 1   6. Becoming easily annoyed or irritable? 1 1 1 0 1 1 1   7. Feeling afraid as if something awful might happen? 0 0 0 0 1 1 1   8. If you checked off any problems, how difficult have these problems made it for you to do your work, take care of things at home, or get along with other people? - - - - 1 1 -   LAYNE-7 Score 4 6 5 3 5 7 7      0-4 = Minimal anxiety  5-9 = Mild anxiety  10-14 = Moderate anxiety  15-21 = Severe anxiety     PHQ-9 Depression Patient Health Questionnaire 7/6/2022 6/13/2022 6/7/2022 5/30/2022 3/10/2022 2/22/2022   Patient agreed to terms: Yes Yes Yes Yes Yes Yes   Little interest or pleasure in doing things 1 1 1 0 1 1   Feeling down, depressed, or hopeless 1 0 1 0 1 1   Trouble falling or staying asleep, or sleeping too much 2 1 2 2 2 2   Feeling tired or having little energy 3 2 2 2 2 2   Poor appetite or overeating 1 2 1 2 2 1   Feeling bad about yourself - or that you are a failure or have let yourself or your family down 1 1 1 1 2 2   Trouble concentrating on things, such as reading the newspaper or watching television 1 1 1 0 1 1   Moving or speaking so slowly that other people could have noticed. Or the opposite - being so fidgety or restless that you have been moving around a lot more than usual 0 0 0 0 0 0   Thoughts that you would be better off dead, or of hurting yourself in some way 0 0 0 0 0 1   PHQ-9 Total Score 10 8 9 7 11 11   If you checked off any  problems, how difficult have these problems made it for you to do your work, take care of things at home, or get along with other people? Somewhat difficult Somewhat difficult Somewhat difficult Somewhat difficult Somewhat difficult Very difficult   Interpretation Moderate Mild Mild Mild Moderate Moderate     0-4 = No intervention  5 to 9 = Mild  10 to 14 = Moderate  15 to 19 = Moderately severe  ?20 = Severe      Objective:       Mental Status Evaluation  Appearance: unremarkable, age appropriate  Behavior: normal, cooperative  Speech: normal tone, normal rate, normal pitch, normal volume  Mood: euthymic  Affect: congruent and appropriate  Thought Process: normal and logical  Thought Content: normal, no suicidality, no homicidality, delusions, or paranoia  Sensorium: grossly intact  Cognition: grossly intact  Insight: intact  Judgment: adequate to circumstances    Risk parameters:  Patient reports no suicidal ideation  Patient reports no homicidal ideation  Patient reports no self-injurious behavior  Patient reports no violent behavior      Assessment & Plan:     The patient's response to the interventions is accepting    The patient's progress toward treatment goals is fair     Homework assigned: none     Treatment plan:   A. Target symptoms: Depression and Anxiety   B. Therapeutic modalities: insight oriented psychotherapy, behavior modifying psychotherapy, supportive psychotherapy  C. Why chosen therapy is appropriate versus another modality: relevant to diagnosis, patient responds to this modality, evidence based practice   D. Outcome monitoring methods: self report, observation, rating scales, feedback from clinical staff      Visit Diagnosis:   1. Generalized anxiety disorder    2. Depression, unspecified depression type        Follow-up: individual psychotherapy    Return to Clinic: 3 weeks  Pt Reported to Schedule Self via Epic EMR MyChart Application and/or Department Support Staff          Adelaide WEN  MEY Sahu  07/06/2022   3:06 PM

## 2022-07-06 NOTE — LETTER
2022      [facility]  [address] [place] [address] [address] [address]  Phone: [phone]  Fax: [phone]       To Whom It May Concern,    Salomón Olson ( 2000) has been receiving psychotherapeutic services from me since 2022. He is diagnosed with Generalized Anxiety Disorder and Depression Disorder. Mr. Escobar current treatment plan includes a hybrid model approach of psychiatric medications and psychotherapy. He has been adherent to and actively engaged in his therapeutic treatment since his establishment of care with me. Relatively, his dates of psychotherapeutic treatment under my care is as follows: 2022; 03/10/2022; 2022; 2022; and 2022. It should be noted, Mr. Olson has further demonstrated his ongoing dedication to his therapeutic treatment plan as evidenced by his proactively scheduling multiple future appointments.     It is my clinical opinion that his psychiatric symptoms have historically impaired his school performance and attendance; however, ethically and professionally, I can only attest to Mr. Escobar psychiatric status within the chronological constraints of our therapeutic relationship. Presently, due to his ongoing attendance to his treatment, Mr. Escobar psychiatric symptomology is improving and has demonstrated positive progress thus far. According to his current psychiatric status and treatment compliance, Mr. Escobar psychiatric prognosis proves promising relative to his future endeavors.     Please feel free to contact me at my office with any further concerns/questions as necessary.     Sincerely,     Adelaide Sahu, [facility]  Outpatient Psychiatric Clinical Therapist

## 2022-07-07 NOTE — PROGRESS NOTES
Per rizwana message from patient:  Need a letter for appeal at [facility]    Will send a letter documenting his care.

## 2022-07-07 NOTE — LETTER
July 7, 2022      [facility]  [address] [place] [address] [address] [address]  Phone: [phone]  Fax: [phone]         To Whom It May Concern:    Cassy Olson (d.2000) has established care with me since 02/01/2022. He is diagnosed with Generalized Anxiety Disorder and Depression Disorder. Mr. Olson's treatment include pharmacotherapy and psychotherapy. His dates of pharmacotherapy is as follows: 02/11/2022; 03/15/2022; 04/20/2022; 06/08/2022.    It is my clinical opinion that his psychiatric symptoms have historically impaired his school performance and attendance.  Presently, Whitney's psychiatric symptomology is improving.        Sincerely,            Mauricio Ferraro, DO   Child, Adolesent and Adult Psychiatry

## 2022-07-13 NOTE — PROGRESS NOTES
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      Site:  [facility]    Salomón Olson is a 22 y.o.  single male with a past psychiatric history of LAYNE and unspecified Depression Who presents for follow up.   Last seen on 6/08/2022  At that visit, these are the recommendations:  will continue Lexapro 15 daily to target anxiety/depressoin  The patient is seeing Ms. Sahu for therapy.       History of Present Illness:     Doing errands.   Not working.  Plan to go to school in the fall.    Mood was fine until yesterday.  Same ole thing - his dad comes in from work - dad was upset. From dad's tone, patient knew he was upset.    He was talking to some friends on the phone at his desk, as soon as the patient was speaking, dad asks what the patient is doing. Patient ended up leaving to [facility].   If it is going well, he will say some positive affirming things.but more often, more negative from dad.     The tone or lack of understanding on dad's part - his reaction that never helps the situation.     Patient  has some issues on his own.   Dad's reaction is not helpful.  mostly fine until yesterday.  Sleep a bit better.  Still need to work on that.  He is staying up till 12 or 1am.   That is an improvement from previously.  Scheduled for 4 classes.   Earliest is at 10am.  Able to wake up at 10am.   Problem is he goes to bed late. Feels his sleep is inconsistent.  4 classes:  Czech, history, communications, maybe communications.  School starts August 20.   He would try the melatonin for a week.     Feels anxious with school starting.    Overall, the medication is working.   Therapy with Adelaide has been going well.    Relationship with mother is better.    They are looking into the family thing.  At the [employer], there are only 2 people .  Cuco and someone else. Need to schedule a family therapy with the female therapist. He is not sure who it is.   Denies si/hi.  Denies a/v hallucinations.    If he can work on communicating at home, fix sleep and low energy, he thinks he will succeed at school.       PHQ-9 Depression Patient Health Questionnaire 7/27/2022   Patient agreed to terms: Yes   Little interest or pleasure in doing things 1   Feeling down, depressed, or hopeless 2   Trouble falling or staying asleep, or sleeping too much 3   Feeling tired or having little energy 2   Poor appetite or overeating 2   Feeling bad about yourself - or that you are a failure or have let yourself or your family down 2   Trouble concentrating on things, such as reading the newspaper or watching television 1   Moving or speaking so slowly that other people could have noticed. Or the opposite - being so fidgety or restless that you have been moving around a lot more than usual 0   Thoughts that you would be better off dead, or of hurting yourself in some way 0   PHQ-9 Total Score 13   If you checked off any problems, how difficult have these problems made it for you to do your work, take care of things at home, or get along with other people? Very difficult   Interpretation Moderate     LAYNE-7 7/27/2022   Was test performed?    1. Feeling nervous, anxious, or on edge? Several days   2. Not being able to stop or control worrying? Several days   3. Worrying too much about different things? More than half the days   4. Trouble relaxing? Several days   5. Being so restless that it is hard to sit still? Several days   6. Becoming easily annoyed or irritable? More than half the days   7. Feeling afraid as if something awful might happen? Several days   8. If you checked off any problems, how difficult have these problems made it for you to do your work, take care of things at home, or get along with other people?    LAYNE-7 Score 9   Number answered (out of first 7) 7    Interpretation Mild Anxiety       Substance Use:   denies any eating disorders.  denies alcohol use.  denies marijuana use   denies illicit drugs.  denies tobacco use.    Past Psychiatric History:   Previous Psychiatric Hospitalizations: No  Previous SI/HI: No  Previous Suicide Attempts: No  Psychiatric Care (current & past): unsure  History of Psychotherapy: Yes - saw 2 in the past.   Before covid.   He was seeing the therapist 1 or twice.         Past Psychiatric Medication Trials: denies       Social History:     Raised by his parents.   Mediocre childhood due to thinking of good and bad things.  no siblings    Marital Status: single, not in a relationship  Children: no  Education: avni at [employer]  Support Person: mom     Current Living Circumstances:  Lives with his parents.      Family Psychiatric History: think uncle has schizophrenia.  Mom and GM wanted to see a therapist.    Trauma History: does not want to talk about it at this time.     Legal History: denies      Current Medications:   Current Outpatient Medications   Medication Instructions    cholecalciferol (vitamin D3) (VITAMIN D3) 2,000 Units, Oral, Daily    EScitalopram oxalate (LEXAPRO) 15 mg, Oral, Daily       Allergies:   Review of patient's allergies indicates:  No Known Allergies    Review Of Systems:      General : NO chills or fever   Eyes: NO  visual changes   ENT: NO hearing change, nasal discharge or sore throat   Endocrine: NO weight changes or polydipsia/polyuria   Dermatological: NO rashes   Respiratory: NO cough, shortness of breath   Cardiovascular: NO chest pain, palpitations or racing heart   Gastrointestinal: NO nausea, vomiting, constipation or diarrhea   Musculoskeletal: NO muscle pain or stiffness   Neurological: NO confusion, dizziness, headaches or tremors   Psychiatric: please see HPI    Past Medical History:     Past Medical History:   Diagnosis Date    Acne vulgaris 2/25/2020    Allergy     Asthma      Generalized anxiety disorder 2/1/2022         Past Surgical History:      has a past surgical history that includes NONE; Tympanostomy tube placement; and Circumcision.    Birth and Developmental History:     Evaluated and found noncontributory.    Current Evaluation:       Constitutional  Vitals:  Vitals:    07/27/22 1604   BP: 117/77   BP Location: Left arm   Patient Position: Sitting   Pulse: 83   Weight: 69.8 kg (153 lb 15.9 oz)      General:  unremarkable, age appropriate     Musculoskeletal  Muscle Strength/Tone:  no tremor, no tic   Gait & Station:  non-ataxic     Mental Status Exam:  Comment:glasses.  Cross his arms.  Fair eye contact.     Appearance: of stated age Casually dressed  Behavior:  cooperative   Psychomotor: Within Normal Limits   Speech:  normal rate, rhythm and volume  Mood:    Fine until yesterday  Affect:  constricted  Thought Process:  within normal limits  Associations: intact  Thought Content:  NO si/hi   Thought Perceptions: no a/v hallucinations.  NO delusions.   Memory:  Intact  Attention Span and Concentration:  Normal  Fund of Knowledge:  Intact  Estimate of Intelligence:  Average   Language: no abnormalities  Insight/Judgement:  Fair  Cognition:  grossly intact  Orientation:  person, place, time and situation    LABORATORY DATA  No visits with results within 1 Month(s) from this visit.   Latest known visit with results is:   Lab Visit on 02/01/2022   Component Date Value Ref Range Status    WBC 02/01/2022 7.25  3.90 - 12.70 K/uL Final    RBC 02/01/2022 5.09  4.60 - 6.20 M/uL Final    Hemoglobin 02/01/2022 15.5  14.0 - 18.0 g/dL Final    Hematocrit 02/01/2022 47.3  40.0 - 54.0 % Final    MCV 02/01/2022 93  82 - 98 fL Final    MCH 02/01/2022 30.5  27.0 - 31.0 pg Final    MCHC 02/01/2022 32.8  32.0 - 36.0 g/dL Final    RDW 02/01/2022 12.3  11.5 - 14.5 % Final    Platelets 02/01/2022 259  150 - 450 K/uL Final    MPV 02/01/2022 10.3  9.2 - 12.9 fL Final    Immature Granulocytes  02/01/2022 0.3  0.0 - 0.5 % Final    Gran # (ANC) 02/01/2022 4.3  1.8 - 7.7 K/uL Final    Immature Grans (Abs) 02/01/2022 0.02  0.00 - 0.04 K/uL Final    Lymph # 02/01/2022 2.0  1.0 - 4.8 K/uL Final    Mono # 02/01/2022 0.7  0.3 - 1.0 K/uL Final    Eos # 02/01/2022 0.1  0.0 - 0.5 K/uL Final    Baso # 02/01/2022 0.06  0.00 - 0.20 K/uL Final    nRBC 02/01/2022 0  0 /100 WBC Final    Gran % 02/01/2022 59.9  38.0 - 73.0 % Final    Lymph % 02/01/2022 28.0  18.0 - 48.0 % Final    Mono % 02/01/2022 9.9  4.0 - 15.0 % Final    Eosinophil % 02/01/2022 1.1  0.0 - 8.0 % Final    Basophil % 02/01/2022 0.8  0.0 - 1.9 % Final    Differential Method 02/01/2022 Automated   Final    Sodium 02/01/2022 140  136 - 145 mmol/L Final    Potassium 02/01/2022 4.0  3.5 - 5.1 mmol/L Final    Chloride 02/01/2022 103  95 - 110 mmol/L Final    CO2 02/01/2022 28  23 - 29 mmol/L Final    Glucose 02/01/2022 89  70 - 110 mg/dL Final    BUN 02/01/2022 11  6 - 20 mg/dL Final    Creatinine 02/01/2022 0.9  0.5 - 1.4 mg/dL Final    Calcium 02/01/2022 9.7  8.7 - 10.5 mg/dL Final    Total Protein 02/01/2022 7.9  6.0 - 8.4 g/dL Final    Albumin 02/01/2022 4.2  3.5 - 5.2 g/dL Final    Total Bilirubin 02/01/2022 0.5  0.1 - 1.0 mg/dL Final    Alkaline Phosphatase 02/01/2022 87  55 - 135 U/L Final    AST 02/01/2022 17  10 - 40 U/L Final    ALT 02/01/2022 15  10 - 44 U/L Final    Anion Gap 02/01/2022 9  8 - 16 mmol/L Final    eGFR if African American 02/01/2022 >60.0  >60 mL/min/1.73 m^2 Final    eGFR if non African American 02/01/2022 >60.0  >60 mL/min/1.73 m^2 Final    TSH 02/01/2022 1.734  0.400 - 4.000 uIU/mL Final    Free T4 02/01/2022 1.01  0.71 - 1.51 ng/dL Final                 Assessment - Diagnosis - Goals:     Assessment and Diagnosis     Status/Progress: Based on the examination today, the patient's problem(s) is/are improving a little but there are family dynamics that is causing patient's depression/anxiety. New  problems have not presented today. Co-morbidities are not complicating management of the primary condition. There are active rule-out diagnoses for this patient at this time.      1. Generalized anxiety disorder     2. Depression, unspecified depression type     3. Insomnia, unspecified type          Interventions/Recommendations/Plan:    PROBLEM:anxiety  COMMENT:improving  PLAN: will continue Lexapro 15 daily to target anxiety/depressoin  Continue seeing ms. Sahu.     PROBLEM:depression  COMMENT:improving  PLAN:see above plan.     PROBLEM:sleep  COMMENT:baseline  PLAN: will recommend taking melatonin. If that does not work to call provider.     PROBLEM:family dynamics  COMMENT:baseline  PLAN: will recommend family therapy with Mercedesoz Onofre.          Return to Clinic: 2 months        SAFETY: plan discussed with patient. Abstain from drug/etoh/tobacco use. Patient to have no access to guns/ weapons. If any suicidal or homicidal ideation or plan, or new or worsening symptoms, call 911/go to ED. Risks, benefits , and alternatives to treatment discussed with patient and guardian who demonstrated understanding and agreement and chose to treat. Patient will call if any questions or concerns. Continue regular follow up with PCP for all non-psychiatric medical conditions.        Lanhuong Nguyen DO [facility]    PSYCHOTHERAPY ADD-ON [phone]     Site: [facility]  Time: 16 minutes  Participants: Met with patient    Therapeutic Intervention Type: insight oriented psychotherapy, behavior modifying psychotherapy, supportive psychotherapy  Why chosen therapy is appropriate versus another modality: relevant to diagnosis, patient responds to this modality, evidence based practice    Target symptoms: sleep hygiene, motivation    Outcome monitoring methods: self-report, observation, checklist/rating scale    Patient's response to intervention:  The patient's response to intervention is  accepting.    Progress toward goals:  The patient's progress toward goals is limited.    Mauricio Ferraro

## 2022-07-19 ENCOUNTER — TELEPHONE (OUTPATIENT)
Dept: PSYCHIATRY | Facility: CLINIC | Age: 22
End: 2022-07-19
Payer: COMMERCIAL

## 2022-07-19 ENCOUNTER — PATIENT MESSAGE (OUTPATIENT)
Dept: PSYCHIATRY | Facility: CLINIC | Age: 22
End: 2022-07-19
Payer: COMMERCIAL

## 2022-07-19 NOTE — TELEPHONE ENCOUNTER
lvm & sent msg for pt that appt needs to be r/s due to provider will be out the rest of the day//Elena
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
## 2022-07-27 ENCOUNTER — OFFICE VISIT (OUTPATIENT)
Dept: PSYCHIATRY | Facility: CLINIC | Age: 22
End: 2022-07-27
Payer: COMMERCIAL

## 2022-07-27 VITALS
SYSTOLIC BLOOD PRESSURE: 117 MMHG | WEIGHT: 154 LBS | HEART RATE: 83 BPM | DIASTOLIC BLOOD PRESSURE: 77 MMHG | BODY MASS INDEX: 26.02 KG/M2

## 2022-07-27 DIAGNOSIS — G47.00 INSOMNIA, UNSPECIFIED TYPE: ICD-10-CM

## 2022-07-27 DIAGNOSIS — F41.1 GENERALIZED ANXIETY DISORDER: Primary | ICD-10-CM

## 2022-07-27 DIAGNOSIS — F32.A DEPRESSION, UNSPECIFIED DEPRESSION TYPE: ICD-10-CM

## 2022-07-27 PROCEDURE — 3074F SYST BP LT 130 MM HG: CPT | Mod: CPTII,S$GLB,, | Performed by: PSYCHIATRY & NEUROLOGY

## 2022-07-27 PROCEDURE — 3074F PR MOST RECENT SYSTOLIC BLOOD PRESSURE < 130 MM HG: ICD-10-PCS | Mod: CPTII,S$GLB,, | Performed by: PSYCHIATRY & NEUROLOGY

## 2022-07-27 PROCEDURE — 90833 PR PSYCHOTHERAPY W/PATIENT W/E&M, 30 MIN (ADD ON): ICD-10-PCS | Mod: S$GLB,,, | Performed by: PSYCHIATRY & NEUROLOGY

## 2022-07-27 PROCEDURE — 3078F PR MOST RECENT DIASTOLIC BLOOD PRESSURE < 80 MM HG: ICD-10-PCS | Mod: CPTII,S$GLB,, | Performed by: PSYCHIATRY & NEUROLOGY

## 2022-07-27 PROCEDURE — 3078F DIAST BP <80 MM HG: CPT | Mod: CPTII,S$GLB,, | Performed by: PSYCHIATRY & NEUROLOGY

## 2022-07-27 PROCEDURE — 99999 PR PBB SHADOW E&M-EST. PATIENT-LVL II: ICD-10-PCS | Mod: PBBFAC,,, | Performed by: PSYCHIATRY & NEUROLOGY

## 2022-07-27 PROCEDURE — 99214 OFFICE O/P EST MOD 30 MIN: CPT | Mod: S$GLB,,, | Performed by: PSYCHIATRY & NEUROLOGY

## 2022-07-27 PROCEDURE — 99999 PR PBB SHADOW E&M-EST. PATIENT-LVL II: CPT | Mod: PBBFAC,,, | Performed by: PSYCHIATRY & NEUROLOGY

## 2022-07-27 PROCEDURE — 3008F PR BODY MASS INDEX (BMI) DOCUMENTED: ICD-10-PCS | Mod: CPTII,S$GLB,, | Performed by: PSYCHIATRY & NEUROLOGY

## 2022-07-27 PROCEDURE — 90833 PSYTX W PT W E/M 30 MIN: CPT | Mod: S$GLB,,, | Performed by: PSYCHIATRY & NEUROLOGY

## 2022-07-27 PROCEDURE — 3008F BODY MASS INDEX DOCD: CPT | Mod: CPTII,S$GLB,, | Performed by: PSYCHIATRY & NEUROLOGY

## 2022-07-27 PROCEDURE — 99214 PR OFFICE/OUTPT VISIT, EST, LEVL IV, 30-39 MIN: ICD-10-PCS | Mod: S$GLB,,, | Performed by: PSYCHIATRY & NEUROLOGY

## 2022-08-01 ENCOUNTER — OFFICE VISIT (OUTPATIENT)
Dept: PSYCHIATRY | Facility: CLINIC | Age: 22
End: 2022-08-01
Payer: COMMERCIAL

## 2022-08-01 DIAGNOSIS — F41.1 GENERALIZED ANXIETY DISORDER: Primary | ICD-10-CM

## 2022-08-01 DIAGNOSIS — F32.A DEPRESSION, UNSPECIFIED DEPRESSION TYPE: ICD-10-CM

## 2022-08-01 PROCEDURE — 90837 PSYTX W PT 60 MINUTES: CPT | Mod: 95,,, | Performed by: SOCIAL WORKER

## 2022-08-01 PROCEDURE — 90837 PR PSYCHOTHERAPY W/PATIENT, 60 MIN: ICD-10-PCS | Mod: 95,,, | Performed by: SOCIAL WORKER

## 2022-08-03 ENCOUNTER — PATIENT MESSAGE (OUTPATIENT)
Dept: PSYCHIATRY | Facility: CLINIC | Age: 22
End: 2022-08-03
Payer: COMMERCIAL

## 2022-08-15 ENCOUNTER — LAB VISIT (OUTPATIENT)
Dept: LAB | Facility: HOSPITAL | Age: 22
End: 2022-08-15
Attending: FAMILY MEDICINE
Payer: COMMERCIAL

## 2022-08-15 ENCOUNTER — OFFICE VISIT (OUTPATIENT)
Dept: INTERNAL MEDICINE | Facility: CLINIC | Age: 22
End: 2022-08-15
Payer: COMMERCIAL

## 2022-08-15 VITALS
WEIGHT: 154.56 LBS | OXYGEN SATURATION: 96 % | HEIGHT: 65 IN | RESPIRATION RATE: 16 BRPM | BODY MASS INDEX: 25.75 KG/M2 | DIASTOLIC BLOOD PRESSURE: 60 MMHG | SYSTOLIC BLOOD PRESSURE: 124 MMHG | HEART RATE: 82 BPM

## 2022-08-15 DIAGNOSIS — Z00.00 PREVENTATIVE HEALTH CARE: ICD-10-CM

## 2022-08-15 DIAGNOSIS — E55.9 VITAMIN D DEFICIENCY: ICD-10-CM

## 2022-08-15 DIAGNOSIS — R53.83 OTHER FATIGUE: ICD-10-CM

## 2022-08-15 DIAGNOSIS — Z13.1 SCREENING FOR DIABETES MELLITUS: ICD-10-CM

## 2022-08-15 DIAGNOSIS — Z13.220 SCREENING FOR LIPID DISORDERS: ICD-10-CM

## 2022-08-15 DIAGNOSIS — Z00.00 PREVENTATIVE HEALTH CARE: Primary | ICD-10-CM

## 2022-08-15 DIAGNOSIS — F41.9 RECURRENT MILD MAJOR DEPRESSIVE DISORDER WITH ANXIETY: Chronic | ICD-10-CM

## 2022-08-15 DIAGNOSIS — F33.0 RECURRENT MILD MAJOR DEPRESSIVE DISORDER WITH ANXIETY: Chronic | ICD-10-CM

## 2022-08-15 PROCEDURE — 3074F SYST BP LT 130 MM HG: CPT | Mod: CPTII,S$GLB,, | Performed by: FAMILY MEDICINE

## 2022-08-15 PROCEDURE — 99999 PR PBB SHADOW E&M-EST. PATIENT-LVL III: ICD-10-PCS | Mod: PBBFAC,,, | Performed by: FAMILY MEDICINE

## 2022-08-15 PROCEDURE — 3008F PR BODY MASS INDEX (BMI) DOCUMENTED: ICD-10-PCS | Mod: CPTII,S$GLB,, | Performed by: FAMILY MEDICINE

## 2022-08-15 PROCEDURE — 3078F PR MOST RECENT DIASTOLIC BLOOD PRESSURE < 80 MM HG: ICD-10-PCS | Mod: CPTII,S$GLB,, | Performed by: FAMILY MEDICINE

## 2022-08-15 PROCEDURE — 99395 PREV VISIT EST AGE 18-39: CPT | Mod: S$GLB,,, | Performed by: FAMILY MEDICINE

## 2022-08-15 PROCEDURE — 1160F PR REVIEW ALL MEDS BY PRESCRIBER/CLIN PHARMACIST DOCUMENTED: ICD-10-PCS | Mod: CPTII,S$GLB,, | Performed by: FAMILY MEDICINE

## 2022-08-15 PROCEDURE — 3008F BODY MASS INDEX DOCD: CPT | Mod: CPTII,S$GLB,, | Performed by: FAMILY MEDICINE

## 2022-08-15 PROCEDURE — 99395 PR PREVENTIVE VISIT,EST,18-39: ICD-10-PCS | Mod: S$GLB,,, | Performed by: FAMILY MEDICINE

## 2022-08-15 PROCEDURE — 80061 LIPID PANEL: CPT | Performed by: FAMILY MEDICINE

## 2022-08-15 PROCEDURE — 99999 PR PBB SHADOW E&M-EST. PATIENT-LVL III: CPT | Mod: PBBFAC,,, | Performed by: FAMILY MEDICINE

## 2022-08-15 PROCEDURE — 80053 COMPREHEN METABOLIC PANEL: CPT | Performed by: FAMILY MEDICINE

## 2022-08-15 PROCEDURE — 3078F DIAST BP <80 MM HG: CPT | Mod: CPTII,S$GLB,, | Performed by: FAMILY MEDICINE

## 2022-08-15 PROCEDURE — 1159F PR MEDICATION LIST DOCUMENTED IN MEDICAL RECORD: ICD-10-PCS | Mod: CPTII,S$GLB,, | Performed by: FAMILY MEDICINE

## 2022-08-15 PROCEDURE — 85027 COMPLETE CBC AUTOMATED: CPT | Performed by: FAMILY MEDICINE

## 2022-08-15 PROCEDURE — 1159F MED LIST DOCD IN RCRD: CPT | Mod: CPTII,S$GLB,, | Performed by: FAMILY MEDICINE

## 2022-08-15 PROCEDURE — 1160F RVW MEDS BY RX/DR IN RCRD: CPT | Mod: CPTII,S$GLB,, | Performed by: FAMILY MEDICINE

## 2022-08-15 PROCEDURE — 82306 VITAMIN D 25 HYDROXY: CPT | Performed by: FAMILY MEDICINE

## 2022-08-15 PROCEDURE — 36415 COLL VENOUS BLD VENIPUNCTURE: CPT | Performed by: FAMILY MEDICINE

## 2022-08-15 PROCEDURE — 3074F PR MOST RECENT SYSTOLIC BLOOD PRESSURE < 130 MM HG: ICD-10-PCS | Mod: CPTII,S$GLB,, | Performed by: FAMILY MEDICINE

## 2022-08-15 RX ORDER — ESCITALOPRAM OXALATE 10 MG/1
15 TABLET ORAL DAILY
Qty: 45 TABLET | Refills: 5 | Status: CANCELLED | OUTPATIENT
Start: 2022-08-15 | End: 2023-02-11

## 2022-08-15 NOTE — PROGRESS NOTES
WELLNESS VISIT (PREVENTIVE SERVICES)  8/15/22  3:30 PM CDT    HEALTH MAINTENANCE INTERVENTIONS - UP TO DATE  The patient has no Health Maintenance topics of status Not Due      HEALTH MAINTENANCE INTERVENTIONS - DUE OR DUE SOON  Health Maintenance Due   Topic Date Due    COVID-19 Vaccine (4 - Booster for Pfizer series) 02/22/2022    TETANUS VACCINE  05/02/2022    Influenza Vaccine (1) 09/01/2022       REASON FOR VISIT: Annual Wellness Visit (Preventive Services)  CHIEF COMPLAINT: Annual Exam      DIAGNOSES SPECIFICALLY EVALUATED AND TREATED THIS ENCOUNTER  1. Preventative health care  - CBC Without Differential; Future  - Lipid Panel; Future  - Vitamin D; Future  - Comprehensive Metabolic Panel; Future    2. Vitamin D deficiency  - Vitamin D; Future    3. Recurrent mild major depressive disorder with anxiety    4. Screening for lipid disorders  - Lipid Panel; Future    5. Screening for diabetes mellitus  - CBC Without Differential; Future    6. Other fatigue  - CBC Without Differential; Future  - Comprehensive Metabolic Panel; Future     --------------------------------------------------------------------------------   JOSIAH SOFÍARAO (MRN [id number], APPT: 8/15/22  3:30 PM CDT)  --------------------------------  Ready to check out. See orders.  Send to lab.  Call-back for annual wellness visit with me in 1 year.  Thanks.  --------------------------------------------------------------------------------    No follow-ups on file.   Future Appointments   Date Time Provider Department Center   11/15/2022  3:30 PM DO [facility]UHI [facility]   11/21/2022  3:00 PM Elke Augustine LCSW [facility] [place]   11/28/2022  5:00 PM Adelaide Sahu LCSW [facility]   8/15/2023  3:00 PM RUDY Oquendo MD [facility] [place]     Problem List Items Addressed This Visit       Recurrent mild major depressive disorder with anxiety (Chronic)    Current Assessment & Plan     Compensated/controlled and stable.           Other fatigue    Relevant Orders    CBC Without Differential (Completed)    Comprehensive Metabolic Panel (Completed)    Vitamin D deficiency    Relevant Orders    Vitamin D (Completed)     Other Visit Diagnoses       Preventative health care    -  Primary    Relevant Orders    CBC Without Differential (Completed)    Lipid Panel (Completed)    Vitamin D (Completed)    Comprehensive Metabolic Panel (Completed)    Screening for lipid disorders        Relevant Orders    Lipid Panel (Completed)    Screening for diabetes mellitus        Relevant Orders    CBC Without Differential (Completed)        Unless noted herein, any chronic conditions are represented as and appear compensated/controlled and stable, and no other significant complaints or concerns were reported.    PRESCRIPTION DRUG MANAGEMENT  Outpatient Medications Prior to Visit   Medication Sig Dispense Refill    cholecalciferol, vitamin D3, (VITAMIN D3) 50 mcg (2,000 unit) Tab Take 1 tablet (2,000 Units total) by mouth once daily. 90 tablet 4    EScitalopram oxalate (LEXAPRO) 10 MG tablet Take 1.5 tablets (15 mg total) by mouth once daily. 45 tablet 5     No facility-administered medications prior to visit.   There are no discontinued medications.    Review of Systems   Constitutional:  Negative for activity change and unexpected weight change.   HENT:  Negative for hearing loss, rhinorrhea and trouble swallowing.    Eyes:  Negative for discharge and visual disturbance.   Respiratory:  Negative for chest tightness and wheezing.    Cardiovascular:  Negative for chest pain and palpitations.   Gastrointestinal:  Negative for blood in stool, constipation, diarrhea and vomiting.   Endocrine: Negative for polydipsia and polyuria.   Genitourinary:  Negative for difficulty urinating, hematuria and urgency.   Musculoskeletal:  Negative for arthralgias, joint swelling and neck pain.   Neurological:  Negative for weakness and headaches.   Psychiatric/Behavioral:  Negative  "for confusion and dysphoric mood.     PHYSICAL EXAM  Vitals:    08/15/22 1533   BP: 124/60   Pulse: 82   Resp: 16   SpO2: 96%   Weight: 70.1 kg (154 lb 8.7 oz)   Height: 5' 4.5" (1.638 m)   CONSTITUTIONAL: Vital signs noted. No apparent distress. Does not appear acutely ill or septic. Appears adequately hydrated.  EYE: Sclerae anicteric. Lids and conjunctiva unremarkable.  ENT: External ENT unremarkable. Hearing grossly intact.  NECK: Trachea midline. Thyroid nontender.  PULM: Lungs clear. Breathing unlabored.  CV: Auscultation reveals regular rate and rhythm. Normal heart sounds. No carotid bruit.  GI: Soft and nontender. Bowel sounds normal.  DERM: Skin warm and moist with normal turgor.  NEURO: There are no gross focal motor deficits or gross deficits of cranial nerves III-XII.  PSYCH: Alert and oriented x 3. Mood is grossly neutral. Affect appropriate. Judgment and insight grossly intact.     PAST MEDICAL HISTORY  Salomón has a past medical history of Acne vulgaris, Allergy, Asthma, and Generalized anxiety disorder.    SURGICAL HISTORY  Salomón has a past surgical history that includes NONE; Tympanostomy tube placement; and Circumcision.    FAMILY HISTORY  Salomón family history includes Cancer in his father; Diabetes in his maternal grandfather and maternal grandmother; Hyperlipidemia in his maternal grandfather; Hypertension in his maternal grandfather and maternal grandmother; Hypothyroidism in his father; No Known Problems in his brother and sister; Other in his paternal grandmother; Prostate cancer in his father; Schizophrenia in his maternal uncle; Sleep apnea in his mother; Tuberculosis in his mother.     ALLERGIES  Review of patient's allergies indicates:  No Known Allergies    SOCIAL HISTORY  Salomón  reports that he has never smoked. He has never used smokeless tobacco. He reports that he does not drink alcohol and does not use drugs.     Documentation entered by me for this encounter may have been done in " "part using speech-recognition technology. Although I have made an effort to ensure accuracy, "sound like" errors may exist and should be interpreted in context.   "

## 2022-08-15 NOTE — PROGRESS NOTES
Individual Psychotherapy Follow-up Visit Progress Note (PhD/LCSW)     Outpatient Psychotherapy - 60 minutes with patient (53 minutes or more) - [address]    Date: 08/16/2022    Visit Type: Telehealth    Due to the nature of this visit type, a virtual visit with synchronous audio and video, each patient to whom this provider administers behavioral health services by telemedicine is: (1) informed of the relationship between the provider and patient and the respective role of any other health care provider with respect to management of the patient; and (2) notified that he or she may decline to receive services by telemedicine and may withdraw from such care at any time.    The patient was informed of the following:     Provider's contact info:  [facility]  [address] [place] [address], [address]rd Floor, [address]  [place] [address]  (Phone) [phone]    If technology issues occur, call office phone: Ph: [phone]  If crisis: Dial 911 or go to nearest Emergency Room (ER)  If questions related to privacy practices: contact [facility] Information Department: [phone]    For security purposes, the pt identified that they were at [address] [place] [address] during today's session and contact number is [phone] (home) .    The pt's emergency contact(s) is Extended Emergency Contact Information  Primary Emergency Contact: Samuel Olson  Address: [address] [place] [id number] [facility]  Home Phone: [phone]  Mobile Phone: [phone]  Relation: Father.    Crisis Disclaimer: Patient was informed that due to the virtual nature of the visit, that if a crisis develops, protocols will be implemented to ensure patient safety, including but not limited to: 1) Initiating a welfare check with local law enforcement and/or 2) Calling 911    8/16/2022  MRN: [id number]  Primary Care Provider: MD Salomón Reed is a  "22 y.o. male who presents today for follow-up of depression and anxiety. Met with patient.      Preferred Name: Salomón     Subjective:       Content of Current Session: Pt reported, "I'm doing ok" upon entry to this session. LCSW utilized active listening/reflection to engage with pt and review experiences since their last session with this provider. Pt reported, "I didn't really know what we could talk about today because nothing has really changed." Pt reported he was taking four classes this semester (Mongolian, history, and 2 communication classes) starting on Aug. 22, 2022. Pt reported he was anxious about his upcoming school performance secondary to his historical inconsistent attendance and anhedonia. LCSW utilized cognitive processing and supportive therapy. LCSW discussed utilization of proactive thinking/planning in preparation for his new school semester. LCSW discussed the importance of proactively introducing safeguards within his routine to provide him with support in further reducing his anhedonia. LCSW utilized person-centered and strengths based perspectives to empower pt and edify his confidence in his task completion. LCSW utilized reality orientation to support pt's objectivity to manage pt's performance expectations and goal setting. LCSW discussed utilization of Socratic Questioning to support pt's identification and challenging of intrusive negative thoughts/cognitive distortions. LCSW and pt verbally processed his historical coping skills and their application to his daily routine. Pt responded appropriately to aforementioned interventions. Pt denied SI/HI/AVH.     Therapeutic Interventions Utilized During Current Session: Cognitive Processing Therapy, Cognitive Behavioral Therapy, Person-Centered Therapy, Reality Therapy, Strength-Based Therapy, Supportive Therapy, Motivational Interviewing    GAD7 8/16/2022 8/1/2022 7/27/2022 7/6/2022 6/13/2022 6/7/2022 5/30/2022   1. Feeling nervous, anxious, " or on edge? 1 1 1 0 1 1 0   2. Not being able to stop or control worrying? 1 1 1 0 1 1 1   3. Worrying too much about different things? 1 2 2 1 1 1 1   4. Trouble relaxing? 1 0 1 1 1 1 1   5. Being so restless that it is hard to sit still? 0 0 1 1 1 0 0   6. Becoming easily annoyed or irritable? 1 1 2 1 1 1 0   7. Feeling afraid as if something awful might happen? 1 1 1 0 0 0 0   8. If you checked off any problems, how difficult have these problems made it for you to do your work, take care of things at home, or get along with other people? - - - - - - -   LAYNE-7 Score 6 6 9 4 6 5 3      0-4 = Minimal anxiety  5-9 = Mild anxiety  10-14 = Moderate anxiety  15-21 = Severe anxiety     PHQ-9 Depression Patient Health Questionnaire 8/16/2022 8/1/2022 7/27/2022 7/6/2022 6/13/2022 6/7/2022 5/30/2022   Patient agreed to terms: Yes Yes Yes Yes Yes Yes Yes   Little interest or pleasure in doing things 1 1 1 1 1 1 0   Feeling down, depressed, or hopeless 1 1 2 1 0 1 0   Trouble falling or staying asleep, or sleeping too much 2 2 3 2 1 2 2   Feeling tired or having little energy 1 1 2 3 2 2 2   Poor appetite or overeating 1 1 2 1 2 1 2   Feeling bad about yourself - or that you are a failure or have let yourself or your family down 1 1 2 1 1 1 1   Trouble concentrating on things, such as reading the newspaper or watching television 0 0 1 1 1 1 0   Moving or speaking so slowly that other people could have noticed. Or the opposite - being so fidgety or restless that you have been moving around a lot more than usual 0 0 0 0 0 0 0   Thoughts that you would be better off dead, or of hurting yourself in some way 0 0 0 0 0 0 0   PHQ-9 Total Score 7 7 13 10 8 9 7   If you checked off any problems, how difficult have these problems made it for you to do your work, take care of things at home, or get along with other people? Somewhat difficult Very difficult Very difficult Somewhat difficult Somewhat difficult Somewhat difficult Somewhat  difficult   Interpretation Mild Mild Moderate Moderate Mild Mild Mild     0-4 = No intervention  5 to 9 = Mild  10 to 14 = Moderate  15 to 19 = Moderately severe  ?20 = Severe      Objective:       Mental Status Evaluation  Appearance: unremarkable, age appropriate  Behavior: normal, cooperative  Speech: normal tone, normal rate, normal pitch, normal volume  Mood: euthymic  Affect: congruent and appropriate  Thought Process: normal and logical  Thought Content: normal, no suicidality, no homicidality, delusions, or paranoia  Sensorium: grossly intact  Cognition: grossly intact  Insight: intact  Judgment: adequate to circumstances    Risk parameters:  Patient reports no suicidal ideation  Patient reports no homicidal ideation  Patient reports no self-injurious behavior  Patient reports no violent behavior      Assessment & Plan:     The patient's response to the interventions is accepting    The patient's progress toward treatment goals is fair     Homework assigned: none     Treatment plan:   A. Target symptoms: Depression and Anxiety   B. Therapeutic modalities: insight oriented psychotherapy, behavior modifying psychotherapy, supportive psychotherapy  C. Why chosen therapy is appropriate versus another modality: relevant to diagnosis, patient responds to this modality, evidence based practice   D. Outcome monitoring methods: self report, observation, rating scales, feedback from clinical staff      Visit Diagnosis:   1. Generalized anxiety disorder    2. Depression, unspecified depression type        Follow-up: individual psychotherapy    Return to Clinic: 3 weeks  Pt Reported to Schedule Self via Epic EMR MyChart Application and/or Department Support Staff          Adelaide Sahu LCSW  08/16/2022   2:00 PM

## 2022-08-16 ENCOUNTER — OFFICE VISIT (OUTPATIENT)
Dept: PSYCHIATRY | Facility: CLINIC | Age: 22
End: 2022-08-16
Payer: COMMERCIAL

## 2022-08-16 DIAGNOSIS — F41.1 GENERALIZED ANXIETY DISORDER: Primary | ICD-10-CM

## 2022-08-16 DIAGNOSIS — F32.A DEPRESSION, UNSPECIFIED DEPRESSION TYPE: ICD-10-CM

## 2022-08-16 LAB
25(OH)D3+25(OH)D2 SERPL-MCNC: 27 NG/ML (ref 30–96)
ALBUMIN SERPL BCP-MCNC: 4.2 G/DL (ref 3.5–5.2)
ALP SERPL-CCNC: 81 U/L (ref 55–135)
ALT SERPL W/O P-5'-P-CCNC: 21 U/L (ref 10–44)
ANION GAP SERPL CALC-SCNC: 9 MMOL/L (ref 8–16)
AST SERPL-CCNC: 24 U/L (ref 10–40)
BILIRUB SERPL-MCNC: 0.5 MG/DL (ref 0.1–1)
BUN SERPL-MCNC: 15 MG/DL (ref 6–20)
CALCIUM SERPL-MCNC: 10 MG/DL (ref 8.7–10.5)
CHLORIDE SERPL-SCNC: 104 MMOL/L (ref 95–110)
CHOLEST SERPL-MCNC: 213 MG/DL (ref 120–199)
CHOLEST/HDLC SERPL: 3.3 {RATIO} (ref 2–5)
CO2 SERPL-SCNC: 28 MMOL/L (ref 23–29)
CREAT SERPL-MCNC: 1.1 MG/DL (ref 0.5–1.4)
ERYTHROCYTE [DISTWIDTH] IN BLOOD BY AUTOMATED COUNT: 12.4 % (ref 11.5–14.5)
EST. GFR  (NO RACE VARIABLE): >60 ML/MIN/1.73 M^2
GLUCOSE SERPL-MCNC: 53 MG/DL (ref 70–110)
HCT VFR BLD AUTO: 47.1 % (ref 40–54)
HDLC SERPL-MCNC: 65 MG/DL (ref 40–75)
HDLC SERPL: 30.5 % (ref 20–50)
HGB BLD-MCNC: 15.2 G/DL (ref 14–18)
LDLC SERPL CALC-MCNC: 110 MG/DL (ref 63–159)
MCH RBC QN AUTO: 30.9 PG (ref 27–31)
MCHC RBC AUTO-ENTMCNC: 32.3 G/DL (ref 32–36)
MCV RBC AUTO: 96 FL (ref 82–98)
NONHDLC SERPL-MCNC: 148 MG/DL
PLATELET # BLD AUTO: 301 K/UL (ref 150–450)
PMV BLD AUTO: 11.2 FL (ref 9.2–12.9)
POTASSIUM SERPL-SCNC: 4.6 MMOL/L (ref 3.5–5.1)
PROT SERPL-MCNC: 8.2 G/DL (ref 6–8.4)
RBC # BLD AUTO: 4.92 M/UL (ref 4.6–6.2)
SODIUM SERPL-SCNC: 141 MMOL/L (ref 136–145)
TRIGL SERPL-MCNC: 190 MG/DL (ref 30–150)
WBC # BLD AUTO: 7.83 K/UL (ref 3.9–12.7)

## 2022-08-16 PROCEDURE — 90837 PSYTX W PT 60 MINUTES: CPT | Mod: 95,,, | Performed by: SOCIAL WORKER

## 2022-08-16 PROCEDURE — 90837 PR PSYCHOTHERAPY W/PATIENT, 60 MIN: ICD-10-PCS | Mod: 95,,, | Performed by: SOCIAL WORKER

## 2022-08-28 NOTE — PROGRESS NOTES
"Salomón Madrigal.    I'm happy to report that your recent test results are fine except that your vitamin D level remains low.    Have you been taking your Vitamin D3 2000 IU tablet every day?     If you haven't, please start now taking it every day.     If you have been taking it daily, I recommend that you double your dose (take two 2000 IU tablets).    All the best,    RUDY Oquendo MD  ----------  If you want to learn more about your test results and what they mean, it's as simple as 1, 2, 3.  1. Log in to [facility] using the [facility] or online at [email or web address].   2. From the "Test Results" tab, click on the test you want to know more about.  3. If using the mobile [facility]ary [facility], click the "Get Info" icon. (The "Get Info" icon looks like a [place] with a lower case letter i inside it.) If using your computer, click the "Get Info" icon or the "About This Test" link."

## 2022-09-02 DIAGNOSIS — E55.9 VITAMIN D DEFICIENCY: Primary | ICD-10-CM

## 2022-09-02 DIAGNOSIS — Z71.3 NUTRITIONAL COUNSELING: ICD-10-CM

## 2022-09-07 NOTE — PROGRESS NOTES
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      Site:  [facility]    Salomón Olson is a 22 y.o.  single male with a past psychiatric history of LAYNE and unspecified Depression Who presents for follow up.   Last seen on 7/27/2022  At that visit, these are the recommendations:  will continue Lexapro 15 daily to target anxiety/depressoin  The patient is seeing Ms. Sahu for therapy.       History of Present Illness:     Back at school taking 12 credit hours,   4 classes:  Upper [employer]bian, history, communications, maybe communications.  Has been going to classes.   Not working.    Anxiety is still there, but it is better.  Things between him and his parents - somewhat ok.   Still need to have the family therapy.  Still taking the lexapro .  There are times he takes at night, it is hard to fall asleep when he takes it at night.  Still fall asleep at 12 or 1 am.   3 times a week.   His earliest class - is 10:30am.     Able to sleep.   Appetite - the same.  Does not feel sad at the moment. Denies si/hi  denies a/v hallucinations.  He appears distant, therefore this provider asks if something is upsetting him.   He notes he is thinking about upcoming family therapy.  That is scheduled in Nov 2022.   His anxiety is manageable.  Depression is manageable.    The anxiety is always there but still better than before.   No side effects   No new allergies. No new medical conditions. No new surgeries.  Since the last visit.         PHQ-9 Depression Patient Health Questionnaire 9/14/2022   Patient agreed to terms: Yes   Little interest or pleasure in doing things 0   Feeling down, depressed, or hopeless 0   Trouble falling or staying asleep, or sleeping too much 2   Feeling tired or having little energy 1   Poor appetite or overeating 1   Feeling bad about  yourself - or that you are a failure or have let yourself or your family down 1   Trouble concentrating on things, such as reading the newspaper or watching television 0   Moving or speaking so slowly that other people could have noticed. Or the opposite - being so fidgety or restless that you have been moving around a lot more than usual 0   Thoughts that you would be better off dead, or of hurting yourself in some way 0   PHQ-9 Total Score 5   If you checked off any problems, how difficult have these problems made it for you to do your work, take care of things at home, or get along with other people? Somewhat difficult   Interpretation Mild     GAD7 9/14/2022   1. Feeling nervous, anxious, or on edge? 0   2. Not being able to stop or control worrying? 1   3. Worrying too much about different things? 2   4. Trouble relaxing? 0   5. Being so restless that it is hard to sit still? 0   6. Becoming easily annoyed or irritable? 1   7. Feeling afraid as if something awful might happen? 0   8. If you checked off any problems, how difficult have these problems made it for you to do your work, take care of things at home, or get along with other people?    LAYNE-7 Score 4       Substance Use:   denies any eating disorders.  denies alcohol use.  denies marijuana use   denies illicit drugs.  denies tobacco use.    Past Psychiatric History:   Previous Psychiatric Hospitalizations: No  Previous SI/HI: No  Previous Suicide Attempts: No  Psychiatric Care (current & past): unsure  History of Psychotherapy: Yes - saw 2 in the past.   Before covid.   He was seeing the therapist 1 or twice.         Past Psychiatric Medication Trials: denies       Social History:     Raised by his parents.   Mediocre childhood due to thinking of good and bad things.  no siblings    Marital Status: single, not in a relationship  Children: no  Education: avni at [employer]  Support Person: mom     Current Living Circumstances:  Lives with his parents.       Family Psychiatric History: think uncle has schizophrenia.  Mom and GM wanted to see a therapist.    Trauma History: does not want to talk about it at this time.     Legal History: denies      Current Medications:   Current Outpatient Medications   Medication Instructions    cholecalciferol (vitamin D3) (VITAMIN D3) 2,000 Units, Oral, Daily    EScitalopram oxalate (LEXAPRO) 15 mg, Oral, Daily       Allergies:   Review of patient's allergies indicates:  No Known Allergies    Review Of Systems:     General : NO chills or fever  Eyes: NO  visual changes  ENT: NO hearing change, nasal discharge or sore throat  Endocrine: NO weight changes or polydipsia/polyuria  Dermatological: NO rashes  Respiratory: NO cough, shortness of breath  Cardiovascular: NO chest pain, palpitations or racing heart  Gastrointestinal: NO nausea, vomiting, constipation or diarrhea  Musculoskeletal: NO muscle pain or stiffness  Neurological: NO confusion, dizziness, headaches or tremors  Psychiatric: please see HPI    Past Medical History:     Past Medical History:   Diagnosis Date    Acne vulgaris 2/25/2020    Allergy     Asthma     Generalized anxiety disorder 2/1/2022         Past Surgical History:      has a past surgical history that includes NONE; Tympanostomy tube placement; and Circumcision.    Birth and Developmental History:     Evaluated and found noncontributory.    Current Evaluation:       Constitutional  Vitals:  There were no vitals filed for this visit.     General:  unremarkable, age appropriate     Musculoskeletal  Muscle Strength/Tone:  no tremor, no tic   Gait & Station:  non-ataxic     Mental Status Exam:  Comment:glasses.  Cross his arms. Appears distant.   Appearance: of stated age Casually dressed  Behavior:  cooperative   Psychomotor: Within Normal Limits   Speech:  normal rate, rhythm and volume  Mood:  happy and irritable and in the middle.   Affect:  dysphoric  Thought Process:  within normal limits  Associations:  intact  Thought Content:  NO si/hi   Thought Perceptions: no a/v hallucinations.  NO delusions.   Memory:  Intact  Attention Span and Concentration:  Normal  Fund of Knowledge:  Intact  Estimate of Intelligence:  Average   Language: no abnormalities  Insight/Judgement:  Fair  Cognition:  grossly intact  Orientation:  person, place, time and situation    LABORATORY DATA  Lab Visit on 08/15/2022   Component Date Value Ref Range Status    WBC 08/15/2022 7.83  3.90 - 12.70 K/uL Final    RBC 08/15/2022 4.92  4.60 - 6.20 M/uL Final    Hemoglobin 08/15/2022 15.2  14.0 - 18.0 g/dL Final    Hematocrit 08/15/2022 47.1  40.0 - 54.0 % Final    MCV 08/15/2022 96  82 - 98 fL Final    MCH 08/15/2022 30.9  27.0 - 31.0 pg Final    MCHC 08/15/2022 32.3  32.0 - 36.0 g/dL Final    RDW 08/15/2022 12.4  11.5 - 14.5 % Final    Platelets 08/15/2022 301  150 - 450 K/uL Final    MPV 08/15/2022 11.2  9.2 - 12.9 fL Final    Cholesterol 08/15/2022 213 (H)  120 - 199 mg/dL Final    Triglycerides 08/15/2022 190 (H)  30 - 150 mg/dL Final    HDL 08/15/2022 65  40 - 75 mg/dL Final    LDL Cholesterol 08/15/2022 110.0  63.0 - 159.0 mg/dL Final    HDL/Cholesterol Ratio 08/15/2022 30.5  20.0 - 50.0 % Final    Total Cholesterol/HDL Ratio 08/15/2022 3.3  2.0 - 5.0 Final    Non-HDL Cholesterol 08/15/2022 148  mg/dL Final    Vit D, 25-Hydroxy 08/15/2022 27 (L)  30 - 96 ng/mL Final    Sodium 08/15/2022 141  136 - 145 mmol/L Final    Potassium 08/15/2022 4.6  3.5 - 5.1 mmol/L Final    Chloride 08/15/2022 104  95 - 110 mmol/L Final    CO2 08/15/2022 28  23 - 29 mmol/L Final    Glucose 08/15/2022 53 (L)  70 - 110 mg/dL Final    BUN 08/15/2022 15  6 - 20 mg/dL Final    Creatinine 08/15/2022 1.1  0.5 - 1.4 mg/dL Final    Calcium 08/15/2022 10.0  8.7 - 10.5 mg/dL Final    Total Protein 08/15/2022 8.2  6.0 - 8.4 g/dL Final    Albumin 08/15/2022 4.2  3.5 - 5.2 g/dL Final    Total Bilirubin 08/15/2022 0.5  0.1 - 1.0 mg/dL Final    Alkaline Phosphatase 08/15/2022 81   55 - 135 U/L Final    AST 08/15/2022 24  10 - 40 U/L Final    ALT 08/15/2022 21  10 - 44 U/L Final    Anion Gap 08/15/2022 9  8 - 16 mmol/L Final    eGFR 08/15/2022 >60.0  >60 mL/min/1.73 m^2 Final                 Assessment - Diagnosis - Goals:     Assessment and Diagnosis     Status/Progress: Based on the examination today, the patient's problem(s) is/are  manageable .  New problems have not presented today. Co-morbidities are not complicating management of the primary condition. There are active rule-out diagnoses for this patient at this time.      1. Generalized anxiety disorder        2. Recurrent mild major depressive disorder with anxiety               Interventions/Recommendations/Plan:    PROBLEM:anxiety  COMMENT:manageable  PLAN: will continue Lexapro 15 daily to target anxiety/depressoin  Continue seeing ms. Sahu.     PROBLEM:depression  COMMENT:manageable  PLAN:see above plan.     PROBLEM:sleep  COMMENT:still sleeping at 12-1am , but class does not start till 10am.   PLAN: will recommend taking melatonin.     PROBLEM:family dynamics  COMMENT:baseline  PLAN: will recommend family therapy with Mercedes Onofre.          Return to Clinic: 2-3 months        SAFETY: plan discussed with patient. Abstain from drug/etoh/tobacco use. Patient to have no access to guns/ weapons. If any suicidal or homicidal ideation or plan, or new or worsening symptoms, call 911/go to ED. Risks, benefits , and alternatives to treatment discussed with patient and guardian who demonstrated understanding and agreement and chose to treat. Patient will call if any questions or concerns. Continue regular follow up with PCP for all non-psychiatric medical conditions.        Lanhuong Nguyen DO [facility]

## 2022-09-14 ENCOUNTER — OFFICE VISIT (OUTPATIENT)
Dept: PSYCHIATRY | Facility: CLINIC | Age: 22
End: 2022-09-14
Payer: COMMERCIAL

## 2022-09-14 VITALS
WEIGHT: 156.44 LBS | DIASTOLIC BLOOD PRESSURE: 82 MMHG | HEART RATE: 73 BPM | BODY MASS INDEX: 26.43 KG/M2 | SYSTOLIC BLOOD PRESSURE: 119 MMHG

## 2022-09-14 DIAGNOSIS — F33.0 RECURRENT MILD MAJOR DEPRESSIVE DISORDER WITH ANXIETY: ICD-10-CM

## 2022-09-14 DIAGNOSIS — F41.9 RECURRENT MILD MAJOR DEPRESSIVE DISORDER WITH ANXIETY: ICD-10-CM

## 2022-09-14 DIAGNOSIS — F41.1 GENERALIZED ANXIETY DISORDER: Primary | ICD-10-CM

## 2022-09-14 PROCEDURE — 3008F BODY MASS INDEX DOCD: CPT | Mod: CPTII,S$GLB,, | Performed by: PSYCHIATRY & NEUROLOGY

## 2022-09-14 PROCEDURE — 99214 PR OFFICE/OUTPT VISIT, EST, LEVL IV, 30-39 MIN: ICD-10-PCS | Mod: S$GLB,,, | Performed by: PSYCHIATRY & NEUROLOGY

## 2022-09-14 PROCEDURE — 99999 PR PBB SHADOW E&M-EST. PATIENT-LVL II: ICD-10-PCS | Mod: PBBFAC,,, | Performed by: PSYCHIATRY & NEUROLOGY

## 2022-09-14 PROCEDURE — 3079F DIAST BP 80-89 MM HG: CPT | Mod: CPTII,S$GLB,, | Performed by: PSYCHIATRY & NEUROLOGY

## 2022-09-14 PROCEDURE — 3008F PR BODY MASS INDEX (BMI) DOCUMENTED: ICD-10-PCS | Mod: CPTII,S$GLB,, | Performed by: PSYCHIATRY & NEUROLOGY

## 2022-09-14 PROCEDURE — 99999 PR PBB SHADOW E&M-EST. PATIENT-LVL II: CPT | Mod: PBBFAC,,, | Performed by: PSYCHIATRY & NEUROLOGY

## 2022-09-14 PROCEDURE — 3079F PR MOST RECENT DIASTOLIC BLOOD PRESSURE 80-89 MM HG: ICD-10-PCS | Mod: CPTII,S$GLB,, | Performed by: PSYCHIATRY & NEUROLOGY

## 2022-09-14 PROCEDURE — 99214 OFFICE O/P EST MOD 30 MIN: CPT | Mod: S$GLB,,, | Performed by: PSYCHIATRY & NEUROLOGY

## 2022-09-14 PROCEDURE — 3074F SYST BP LT 130 MM HG: CPT | Mod: CPTII,S$GLB,, | Performed by: PSYCHIATRY & NEUROLOGY

## 2022-09-14 PROCEDURE — 3074F PR MOST RECENT SYSTOLIC BLOOD PRESSURE < 130 MM HG: ICD-10-PCS | Mod: CPTII,S$GLB,, | Performed by: PSYCHIATRY & NEUROLOGY

## 2022-10-26 ENCOUNTER — TELEPHONE (OUTPATIENT)
Dept: PSYCHIATRY | Facility: CLINIC | Age: 22
End: 2022-10-26
Payer: COMMERCIAL

## 2023-07-21 ENCOUNTER — PATIENT MESSAGE (OUTPATIENT)
Dept: PSYCHIATRY | Facility: CLINIC | Age: 23
End: 2023-07-21
Payer: COMMERCIAL

## 2023-10-21 ENCOUNTER — PATIENT OUTREACH (OUTPATIENT)
Dept: ADMINISTRATIVE | Facility: HOSPITAL | Age: 23
End: 2023-10-21
Payer: COMMERCIAL

## 2023-10-21 NOTE — PROGRESS NOTES
Working Report not seen in > 12 months:     Called pt to discuss scheduling annual exam.  Unable to reach, LVM

## 2024-01-18 ENCOUNTER — PATIENT OUTREACH (OUTPATIENT)
Dept: ADMINISTRATIVE | Facility: HOSPITAL | Age: 24
End: 2024-01-18
Payer: COMMERCIAL

## 2024-02-07 ENCOUNTER — PATIENT OUTREACH (OUTPATIENT)
Dept: ADMINISTRATIVE | Facility: HOSPITAL | Age: 24
End: 2024-02-07
Payer: COMMERCIAL

## 2024-02-07 NOTE — PROGRESS NOTES
LAST PCP VISIT > 12 MONTHS: per chart review pt is overdue for annual visit, attempted to contact pt no ans, lvm.